# Patient Record
(demographics unavailable — no encounter records)

---

## 2017-12-19 NOTE — EMERGENCY DEPARTMENT REPORT
ED General Adult HPI





- General


Chief complaint: Nausea/Vomiting/Diarrhea


Stated complaint: NAUSEA/VOMITING


Time Seen by Provider: 12/19/17 06:27


Source: patient


Mode of arrival: Ambulatory


Limitations: No Limitations





- History of Present Illness


Initial comments: 


Patient complains of nausea and vomiting essentially hyperemesis gravidarum for 

the last few months.  She presents for persistent symptoms.  She does have OB 

care.  She's had a prior ultrasound.  She is more than 9 weeks pregnant.  She 

states that she had some discomfort in her epigastric region but it was 

apparently mild.  She's had no signs of GI bleeding.  She has a history of UTI 

at the end of September treated with Macrobid.





-: Gradual, month(s)


Location: abdomen


Radiation: non-radiation


Quality: aching


Consistency: intermittent


Improves with: none


Worsens with: none


Associated Symptoms: denies other symptoms


Treatments Prior to Arrival: none





- Related Data


 Home Medications











 Medication  Instructions  Recorded  Confirmed  Last Taken


 


Nitrofurantoin Mono/M-Cryst 1 tab PO Q12HR 09/28/16 09/28/16 Unknown





[Macrobid CAP]    


 


Ondansetron [Zofran ODT TAB] 1 tab PO Q8HR 09/28/16 09/28/16 Unknown








 Previous Rx's











 Medication  Instructions  Recorded  Last Taken  Type


 


Ferrous Sulfate [Feosol 325 MG tab] 325 mg PO BID #60 tablet 09/28/16 Unknown Rx


 


Ibuprofen [Motrin 800 MG tab] 800 mg PO Q8HR PRN #30 tablet 09/28/16 Unknown Rx


 


oxyCODONE /ACETAMINOPHEN [Percocet 1 tab PO Q6HR PRN #30 tablet 09/28/16 

Unknown Rx





5/325]    


 


Cefuroxime [Ceftin] 250 mg PO Q12H #10 tablet 12/19/17 Unknown Rx


 


Famotidine [Pepcid] 20 mg PO QDAY #20 tablet 12/19/17 Unknown Rx


 


Ondansetron [Zofran Odt] 4 mg PO Q6H PRN #7 tab.rapdis 12/19/17 Unknown Rx











 Allergies











Allergy/AdvReac Type Severity Reaction Status Date / Time


 


Penicillins AdvReac  Rash Verified 09/26/16 20:29














ED Review of Systems


ROS: 


Stated complaint: NAUSEA/VOMITING


Other details as noted in HPI





Constitutional: denies: chills, fever


Eyes: denies: eye pain, eye discharge, vision change


ENT: denies: ear pain, throat pain


Respiratory: denies: cough, shortness of breath, wheezing


Cardiovascular: denies: chest pain, palpitations


Endocrine: no symptoms reported


Gastrointestinal: abdominal pain, nausea, vomiting.  denies: diarrhea


Genitourinary: denies: urgency, dysuria, discharge


Musculoskeletal: denies: back pain, joint swelling, arthralgia


Skin: denies: rash, lesions


Neurological: denies: headache, weakness, paresthesias


Psychiatric: denies: anxiety, depression


Hematological/Lymphatic: denies: easy bleeding, easy bruising





ED Past Medical Hx





- Past Medical History


Previous Medical History?: Yes


Hx Hypertension: No


Hx Congestive Heart Failure: No


Hx Diabetes: No


Hx Deep Vein Thrombosis: No


Hx Renal Disease: No


Hx Sickle Cell Disease: No


Hx Seizures: No


Hx Asthma: No


Hx COPD: No


Additional medical history: Vaginal delivery x 2





- Surgical History


Past Surgical History?: No





- Social History


Smoking Status: Never Smoker


Substance Use Type: None





- Medications


Home Medications: 


 Home Medications











 Medication  Instructions  Recorded  Confirmed  Last Taken  Type


 


Ferrous Sulfate [Feosol 325 MG tab] 325 mg PO BID #60 tablet 09/28/16  Unknown 

Rx


 


Ibuprofen [Motrin 800 MG tab] 800 mg PO Q8HR PRN #30 tablet 09/28/16  Unknown Rx


 


Nitrofurantoin Mono/M-Cryst 1 tab PO Q12HR 09/28/16 09/28/16 Unknown History





[Macrobid CAP]     


 


Ondansetron [Zofran ODT TAB] 1 tab PO Q8HR 09/28/16 09/28/16 Unknown History


 


oxyCODONE /ACETAMINOPHEN [Percocet 1 tab PO Q6HR PRN #30 tablet 09/28/16  

Unknown Rx





5/325]     


 


Cefuroxime [Ceftin] 250 mg PO Q12H #10 tablet 12/19/17  Unknown Rx


 


Famotidine [Pepcid] 20 mg PO QDAY #20 tablet 12/19/17  Unknown Rx


 


Ondansetron [Zofran Odt] 4 mg PO Q6H PRN #7 tab.rapdis 12/19/17  Unknown Rx














ED Physical Exam





- General


Limitations: No Limitations


General appearance: alert, in no apparent distress, other (appears somewhat 

volume depleted)





- Head


Head exam: Present: atraumatic, normocephalic





- Eye


Eye exam: Present: normal appearance, PERRL, EOMI.  Absent: scleral icterus





- ENT


ENT exam: Present: mucous membranes moist





- Neck


Neck exam: Present: normal inspection.  Absent: tenderness, meningismus





- Respiratory


Respiratory exam: Present: normal lung sounds bilaterally.  Absent: respiratory 

distress





- Cardiovascular


Cardiovascular Exam: Present: regular rate, normal rhythm.  Absent: systolic 

murmur, diastolic murmur, rubs, gallop





- GI/Abdominal


GI/Abdominal exam: Present: soft, normal bowel sounds.  Absent: distended, 

tenderness, guarding, rebound, rigid





- Extremities Exam


Extremities exam: Present: normal inspection





- Back Exam


Back exam: Present: normal inspection





- Neurological Exam


Neurological exam: Present: alert, oriented X3





- Psychiatric


Psychiatric exam: Present: normal affect, normal mood





- Skin


Skin exam: Present: warm, dry, intact, normal color.  Absent: rash





ED Course


 Vital Signs











  12/18/17 12/19/17 12/19/17





  17:16 05:06 05:07


 


Temperature 99 F 98.1 F 


 


Pulse Rate 97 H 103 H 


 


Respiratory 23 16 16





Rate   


 


Blood Pressure 104/69  


 


Blood Pressure  111/75 





[Left]   


 


O2 Sat by Pulse 99 100 100





Oximetry   














- Reevaluation(s)


Reevaluation #1: 


Patient was given Zofran and IV fluids.  One dose of ceftriaxone.  One dose of 

potassium.  Urine culture sent.  She was discharged in improved condition.


12/19/17 09:17





12/19/17 09:17








ED Medical Decision Making





- Lab Data


Result diagrams: 


 12/18/17 17:28





 12/18/17 17:28








 Laboratory Results - last 24 hr











  12/18/17 12/18/17 12/18/17





  17:28 17:28 18:10


 


WBC  8.1  


 


RBC  5.04 H  


 


Hgb  15.2 H  


 


Hct  42.8  


 


MCV  85  


 


MCH  30  


 


MCHC  36 H  


 


RDW  13.0 L  


 


Plt Count  222  


 


Lymph % (Auto)  36.8 H  


 


Mono % (Auto)  11.5 H  


 


Eos % (Auto)  0.4  


 


Baso % (Auto)  0.3  


 


Lymph #  3.0  


 


Mono #  0.9 H  


 


Eos #  0.0  


 


Baso #  0.0  


 


Seg Neutrophils %  51.0  


 


Seg Neutrophils #  4.1  


 


Sodium   135 L 


 


Potassium   3.2 L 


 


Chloride   93.9 L 


 


Carbon Dioxide   21 L 


 


Anion Gap   23 


 


BUN   7 


 


Creatinine   0.4 L 


 


Estimated GFR   > 60 


 


BUN/Creatinine Ratio   18 


 


Glucose   98 


 


Calcium   9.5 


 


Lipase   


 


HCG, Quant   


 


Urine Color    Dark yellow


 


Urine Turbidity    Clear


 


Urine pH    6.0


 


Ur Specific Gravity    1.010


 


Urine Protein    30 mg/dl


 


Urine Glucose (UA)    Negative


 


Urine Ketones    100


 


Urine Blood    Negative


 


Urine Nitrite    Negative


 


Urine Bilirubin    Negative


 


Urine Urobilinogen    < 2.0


 


Ur Leukocyte Esterase    Moderate


 


Urine WBC (Auto)    10.0 H


 


Urine RBC (Auto)    4.0


 


U Epithel Cells (Auto)    25.0 H


 


Urine HCG, Qual    Positive A














  12/18/17 12/18/17





  20:12 20:12


 


WBC  


 


RBC  


 


Hgb  


 


Hct  


 


MCV  


 


MCH  


 


MCHC  


 


RDW  


 


Plt Count  


 


Lymph % (Auto)  


 


Mono % (Auto)  


 


Eos % (Auto)  


 


Baso % (Auto)  


 


Lymph #  


 


Mono #  


 


Eos #  


 


Baso #  


 


Seg Neutrophils %  


 


Seg Neutrophils #  


 


Sodium  


 


Potassium  


 


Chloride  


 


Carbon Dioxide  


 


Anion Gap  


 


BUN  


 


Creatinine  


 


Estimated GFR  


 


BUN/Creatinine Ratio  


 


Glucose  


 


Calcium  


 


Lipase  106 H 


 


HCG, Quant   474480 H


 


Urine Color  


 


Urine Turbidity  


 


Urine pH  


 


Ur Specific Gravity  


 


Urine Protein  


 


Urine Glucose (UA)  


 


Urine Ketones  


 


Urine Blood  


 


Urine Nitrite  


 


Urine Bilirubin  


 


Urine Urobilinogen  


 


Ur Leukocyte Esterase  


 


Urine WBC (Auto)  


 


Urine RBC (Auto)  


 


U Epithel Cells (Auto)  


 


Urine HCG, Qual  











Critical care attestation.: 


If time is entered above; I have spent that time in minutes in the direct care 

of this critically ill patient, excluding procedure time.








ED Disposition


Clinical Impression: 


 Hyperemesis gravidarum, Hypokalemia





UTI (urinary tract infection)


Qualifiers:


 Urinary tract infection type: site unspecified Hematuria presence: without 

hematuria Qualified Code(s): N39.0 - Urinary tract infection, site not specified





Disposition: DC-01 TO HOME OR SELFCARE


Is pt being admited?: No


Does the pt Need Aspirin: No


Condition: Stable


Instructions:  Hyperemesis Gravidarum (ED), Urinary Tract Infection in Women (ED

), Hypokalemia (ED)


Additional Instructions: 


Fluids and advance as tolerated.  Follow-up with your OB doctor.  Rx as 

directed.  Follow up on your urine culture.


Prescriptions: 


Cefuroxime [Ceftin] 250 mg PO Q12H #10 tablet


Famotidine [Pepcid] 20 mg PO QDAY #20 tablet


Ondansetron [Zofran Odt] 4 mg PO Q6H PRN #7 tab.rapdis


 PRN Reason: nausea


Referrals: 


CYNDIE CHENEY MD [Primary Care Provider] - 3-5 Days


usual, obstetrician [Other] - 3-5 Days


Time of Disposition: 09:18


Print Language: [place]ian

## 2018-01-04 NOTE — ULTRASOUND REPORT
FINAL REPORT



EXAM:  US ABDOMEN LIMITED



HISTORY:  abdominal pain 



TECHNIQUE:  Routine imaging was obtained of the right upper outer

quadrant.



FINDINGS:  

The gallbladder is mildly dilated with wall thickness of 1.9

millimeters. There are dependent stones and sludge in the

gallbladder. Miller sign was not elicited when scanning over the

gallbladder. The common bile duct is enlarged 7.7 millimeters.

The liver is normal in size and echotexture. The pancreatic head

appears normal. The body and tail of pancreas are not adequately

seen for evaluation. Free fluid is not identified.



The right kidney measures 9.4 cm x 3.7 cm x 5.4 cm. There are

several complex cysts in the middle 3rd upper pole of the right

kidney measuring 1.7 cm in diameter with internal echoes. There

is no evidence of hydronephrosis.



IMPRESSION:  

Gallstones with dependent sludge. No additional secondary signs

of acute cholecystitis.



Dilated common bile duct at 7.7 millimeters. A distal common bile

duct stone cannot be excluded.



Several small complex cysts in the middle 3rd upper pole the

right kidney with internal echoes. Repeat examination of the

kidneys recommended 6 months to confirm stability.

## 2018-01-04 NOTE — EMERGENCY DEPARTMENT REPORT
ED Abdominal Pain HPI





- General


Chief Complaint: Abdominal Pain


Stated Complaint: VOMITING


Time Seen by Provider: 01/03/18 21:14


Source: patient


Mode of arrival: Ambulatory


Limitations: No Limitations





- History of Present Illness


Initial Comments: 





Patient with nausea and vomiting for her whole pregnancy and has actually lost 

22 lbs thus far.  She saw her OB today and was sent here as she has ketones in 

her urine and was tachycardic.  She does report having abdominal pain when she 

eats as well. 


MD Complaint: abdominal pain


-: Gradual, week(s) (12)


Location: diffuse, RUQ


Radiation: RUQ


Migration to: no migration


Severity: moderate


Severity scale (0 -10): 8


Quality: cramping, sharp


Consistency: constant, intermittent


Improves With: nothing


Worsens With: eating


Associated Symptoms: nausea, vomiting





- Related Data


 Home Medications











 Medication  Instructions  Recorded  Confirmed  Last Taken


 


Nitrofurantoin Mono/M-Cryst 1 tab PO Q12HR 09/28/16 09/28/16 Unknown





[Macrobid CAP]    


 


Ondansetron [Zofran ODT TAB] 1 tab PO Q8HR 09/28/16 09/28/16 Unknown








 Previous Rx's











 Medication  Instructions  Recorded  Last Taken  Type


 


Ferrous Sulfate [Feosol 325 MG tab] 325 mg PO BID #60 tablet 09/28/16 Unknown Rx


 


Ibuprofen [Motrin 800 MG tab] 800 mg PO Q8HR PRN #30 tablet 09/28/16 Unknown Rx


 


oxyCODONE /ACETAMINOPHEN [Percocet 1 tab PO Q6HR PRN #30 tablet 09/28/16 

Unknown Rx





5/325]    


 


Cefuroxime [Ceftin] 250 mg PO Q12H #10 tablet 12/19/17 Unknown Rx


 


Famotidine [Pepcid] 20 mg PO QDAY #20 tablet 12/19/17 Unknown Rx


 


Ondansetron [Zofran Odt] 4 mg PO Q6H PRN #7 tab.rapdis 12/19/17 Unknown Rx











 Allergies











Allergy/AdvReac Type Severity Reaction Status Date / Time


 


Penicillins AdvReac  Rash Verified 09/26/16 20:29














ED Review of Systems


ROS: 


Stated complaint: VOMITING


Other details as noted in HPI





Constitutional: denies: chills, fever


Eyes: denies: eye pain, eye discharge, vision change


ENT: denies: ear pain, throat pain


Respiratory: denies: cough, shortness of breath, wheezing


Cardiovascular: denies: chest pain, palpitations


Endocrine: no symptoms reported


Gastrointestinal: abdominal pain, nausea, vomiting.  denies: diarrhea


Genitourinary: denies: urgency, dysuria, discharge


Musculoskeletal: denies: back pain, joint swelling, arthralgia


Skin: denies: rash, lesions


Neurological: denies: headache, weakness, paresthesias


Psychiatric: denies: anxiety, depression


Hematological/Lymphatic: denies: easy bleeding, easy bruising





ED Past Medical Hx





- Past Medical History


Hx Hypertension: No


Hx Congestive Heart Failure: No


Hx Diabetes: No


Hx Deep Vein Thrombosis: No


Hx Renal Disease: No


Hx Sickle Cell Disease: No


Hx Seizures: No


Hx Asthma: No


Hx COPD: No


Additional medical history: Vaginal delivery x 2





- Social History


Smoking Status: Never Smoker


Substance Use Type: None





- Medications


Home Medications: 


 Home Medications











 Medication  Instructions  Recorded  Confirmed  Last Taken  Type


 


Ferrous Sulfate [Feosol 325 MG tab] 325 mg PO BID #60 tablet 09/28/16  Unknown 

Rx


 


Ibuprofen [Motrin 800 MG tab] 800 mg PO Q8HR PRN #30 tablet 09/28/16  Unknown Rx


 


Nitrofurantoin Mono/M-Cryst 1 tab PO Q12HR 09/28/16 09/28/16 Unknown History





[Macrobid CAP]     


 


Ondansetron [Zofran ODT TAB] 1 tab PO Q8HR 09/28/16 09/28/16 Unknown History


 


oxyCODONE /ACETAMINOPHEN [Percocet 1 tab PO Q6HR PRN #30 tablet 09/28/16  

Unknown Rx





5/325]     


 


Cefuroxime [Ceftin] 250 mg PO Q12H #10 tablet 12/19/17  Unknown Rx


 


Famotidine [Pepcid] 20 mg PO QDAY #20 tablet 12/19/17  Unknown Rx


 


Ondansetron [Zofran Odt] 4 mg PO Q6H PRN #7 tab.rapdis 12/19/17  Unknown Rx














ED Physical Exam





- General


Limitations: No Limitations


General appearance: alert, in no apparent distress





- Head


Head exam: Present: atraumatic, normocephalic





- Eye


Eye exam: Present: normal appearance





- ENT


ENT exam: Present: mucous membranes moist





- Neck


Neck exam: Present: normal inspection





- Respiratory


Respiratory exam: Present: normal lung sounds bilaterally.  Absent: respiratory 

distress





- Cardiovascular


Cardiovascular Exam: Present: normal rhythm, tachycardia.  Absent: systolic 

murmur, diastolic murmur, rubs, gallop





- GI/Abdominal


GI/Abdominal exam: Present: soft, distended, tenderness (RUQ but no peritonitis

, She does have positive Miller's sign.), normal bowel sounds





- Extremities Exam


Extremities exam: Present: normal inspection





- Back Exam


Back exam: Present: normal inspection





- Neurological Exam


Neurological exam: Present: alert, oriented X3





- Psychiatric


Psychiatric exam: Present: normal affect, normal mood





- Skin


Skin exam: Present: warm, dry, intact, normal color.  Absent: rash





ED Course


 Vital Signs











  01/03/18 01/03/18 01/03/18





  14:42 20:26 21:20


 


Temperature 97.4 F L 97.4 F L 


 


Pulse Rate 128 H 136 H 


 


Respiratory 18 18 





Rate   


 


Blood Pressure 108/77 113/63 


 


O2 Sat by Pulse 99 97 98





Oximetry   














  01/03/18 01/03/18





  21:30 22:27


 


Temperature  


 


Pulse Rate  


 


Respiratory  16





Rate  


 


Blood Pressure 111/66 


 


O2 Sat by Pulse 98 98





Oximetry  














ED Medical Decision Making





- Lab Data


Result diagrams: 


 01/03/18 15:45





 01/03/18 15:45


Patient with low potassium and appears dehydrated. Liver enzymes are elevated 

as well. 





- Radiology Data


Radiology results: report reviewed





Patient with IUP at 12 weeks and 6 days. US GB revealed dilation of common duct 

with gallstones and sludge but no signs of acute cholecystitis.





- Medical Decision Making





Patient with abdominal pain and pregnant with what appears to be cholelithiasis 

and common bile duct involvement.  D/W Dr. Aggarwal and they will admit for 

monitoring since she can't tolerate PO, is dehdyrated and has cholelithiasis 

with LFTs elevated.


Critical care attestation.: 


If time is entered above; I have spent that time in minutes in the direct care 

of this critically ill patient, excluding procedure time.








ED Disposition


Clinical Impression: 


 Hyperemesis gravidarum, Cholelithiasis affecting pregnancy in first trimester, 

antepartum, Elevated LFTs, Dehydration during pregnancy





Disposition: DC-09 OP ADMIT IP TO THIS HOSP


Is pt being admited?: Yes


Does the pt Need Aspirin: No


Condition: Stable


Instructions:  Abdominal Pain (ED)


Referrals: 


TEREAS ZEPEDA MD [Referring] - 3-5 Days


Time of Disposition: 01:41

## 2018-01-04 NOTE — CONSULTATION
History of Present Illness


Consult date: 01/04/18


Reason for consult: abdominal pain


Requesting physician: BRANDEN CERON


Chief complaint: 





Abomdinal pain, Nausea, and vomiting for 5 days





- History of present illness


History of present illness: 





Patient is a 40-year-old female who is 12 weeks pregnant and presented to the 

emergency room last night with a five-day history of abdominal pain, nausea, 

vomiting.  Patient reports she has had nausea and vomiting during her pregnancy

, but this time it was more bitter and dark yellow in color as opposed to 

clear.  She reports that all of a sudden her vomit changed as described above 

and she began having right quadrant pain.  This is pretty much present 

constantly.  She would have very brief periods of decrease in intensity but the 

pain was always there.  The pain does go to the back and both shoulders.  It 

hurts to touch in the right shoulder and the right side of the back.  She 

reports that she has not had a bowel movement up until today.  Today's bowel 

movement was diarrhea with lots of water.  The appearance of the bowel movement 

was grayish.  Her urine has been very dark.  Denies any yellow eyes or skin.  

Denies any postprandial pain in the past.  Has never been told she has problems 

with gallstones.  Denies any fevers or severe chills. 





Past History


Past Medical History: No medical history


Past Surgical History: No surgical history


Social history: , other (does not drink or smoke)


Family history: no significant family history





Medications and Allergies


 Allergies











Allergy/AdvReac Type Severity Reaction Status Date / Time


 


Penicillins AdvReac  Rash Verified 01/04/18 13:31











 Home Medications











 Medication  Instructions  Recorded  Confirmed  Last Taken  Type


 


Ferrous Sulfate [Feosol 325 MG tab] 325 mg PO BID #60 tablet 09/28/16  Unknown 

Rx


 


Ibuprofen [Motrin 800 MG tab] 800 mg PO Q8HR PRN #30 tablet 09/28/16  Unknown Rx


 


Nitrofurantoin Mono/M-Cryst 1 tab PO Q12HR 09/28/16 09/28/16 Unknown History





[Macrobid CAP]     


 


Ondansetron [Zofran ODT TAB] 1 tab PO Q8HR 09/28/16 09/28/16 Unknown History


 


oxyCODONE /ACETAMINOPHEN [Percocet 1 tab PO Q6HR PRN #30 tablet 09/28/16  

Unknown Rx





5/325]     


 


Cefuroxime [Ceftin] 250 mg PO Q12H #10 tablet 12/19/17  Unknown Rx


 


Famotidine [Pepcid] 20 mg PO QDAY #20 tablet 12/19/17  Unknown Rx


 


Ondansetron [Zofran Odt] 4 mg PO Q6H PRN #7 tab.rapdis 12/19/17  Unknown Rx











Active Meds: 


Active Medications





Sodium Chloride (Nacl 0.9% 1000 Ml)  1,000 mls @ 125 mls/hr IV AS DIRECT ISABELA


   Last Admin: 01/04/18 10:46 Dose:  125 mls/hr


Potassium Chloride (Kcl 10meq/100ml)  10 meq in 100 mls @ 100 mls/hr IV Q1H ISABELA


   Stop: 01/04/18 17:59


Morphine Sulfate (Morphine)  4 mg IV Q8H PRN


   PRN Reason: Pain , Severe (7-10)


   Last Admin: 01/04/18 13:09 Dose:  4 mg


Multivitamins/Iron/Calcium (Prenatal Vitamin)  1 each PO QDAY ISABELA


   Last Admin: 01/04/18 11:19 Dose:  1 each


Ondansetron HCl (Zofran)  4 mg IV Q8HR PRN


   PRN Reason: Nausea


   Last Admin: 01/04/18 08:18 Dose:  4 mg











Review of Systems





- Constitutional


fatigue, no fever, no chills, no sweats





- EENT


Ears, nose, mouth and throat: other (dry mouth)





- Breasts


deferred





- Cardiovascular


no chest pain, no shortness of breath





- Respiratory


no cough, no shortness of breath, no congestion, no pain, no pain on inspiration





- Gastrointestinal


abdominal pain (right upper quadrant), nausea, vomiting, diarrhea (began today)

, heartburn, dyspepsia/bloating, no BRBPR, no melena, no hematochezia





- Genitourinary


Genitourinary: no dysuria





- Muskuloskeletal


other (his bilateral shoulder pain and right flank pain)


bilateral: shoulder pain





- Integumentary


no pruritis, no jaundice





Exam


 Vital Signs











Temp Pulse Resp BP Pulse Ox


 


 97.4 F L  128 H  18   108/77   99 


 


 01/03/18 14:42  01/03/18 14:42  01/03/18 14:42  01/03/18 14:42  01/03/18 14:42














- General physical appearance


Positive: well developed, well nourished, no distress





- Eyes


Positive: normal occular movement.  Negative: icteric





- ENT


Positive: no congestion, other (dry oral mucosa)





- Neck


Positive: no masses, no lymphadectomy





- Respiratory


Positive: normal expansion, normal respiratory effort, clear to auscultation





- Cardiovascular


Rhythm: other (slightly fast)





- Extremities


Extremities: no ischemia, No edema, normal temperature, normal color


Extremity abnormal: tenderness (in right shoulder)





- Abdomen


Abdomen: Present: soft, tender (mild and only in the right upper quadrant.), 

bowel sounds normal.  Absent: distended, masses, rebound, guarding, rigid





- Psychiatric


Psychiatric: appropriate mood/affect, intact judgment & insight, cooperative





- Additional Findings





Tender palpation in the right flank and back





Results





- Labs





 01/04/18 10:46





 01/04/18 10:46


 Abnormal lab results











  01/03/18 01/03/18 01/03/18 Range/Units





  15:45 15:45 16:05 


 


Hgb  15.0 H    (10.1-14.3)  gm/dl


 


MCHC  36 H    (30-34)  %


 


Mono % (Auto)  11.1 H    (0.0-7.3)  %


 


Mono #  0.9 H    (0.0-0.8)  K/mm3


 


Seg Neutrophils %     (40.0-70.0)  %


 


Seg Neutrophils #     (1.8-7.7)  K/mm3


 


Sodium   134 L   (137-145)  mmol/L


 


Potassium   2.6 L*   (3.6-5.0)  mmol/L


 


Chloride   91.0 L   ()  mmol/L


 


Carbon Dioxide     (22-30)  mmol/L


 


BUN     (7-17)  mg/dL


 


Creatinine   0.4 L   (0.7-1.2)  mg/dL


 


Glucose   114 H   ()  mg/dL


 


Calcium     (8.4-10.2)  mg/dL


 


Total Bilirubin   1.50 H   (0.1-1.2)  mg/dL


 


AST   110 H   (5-40)  units/L


 


ALT   118 H   (7-56)  units/L


 


Total Protein     (6.3-8.2)  g/dL


 


Albumin     (3.9-5)  g/dL


 


Amylase     ()  units/L


 


Lipase     (13-60)  units/L


 


Urine WBC (Auto)    15.0 H  (0.0-6.0)  /HPF


 


Urine HCG, Qual     (Negative)  














  01/03/18 01/04/18 01/04/18 Range/Units





  23:59 01:54 01:54 


 


Hgb     (10.1-14.3)  gm/dl


 


MCHC     (30-34)  %


 


Mono % (Auto)     (0.0-7.3)  %


 


Mono #     (0.0-0.8)  K/mm3


 


Seg Neutrophils %     (40.0-70.0)  %


 


Seg Neutrophils #     (1.8-7.7)  K/mm3


 


Sodium    135 L  (137-145)  mmol/L


 


Potassium    2.4 L*  (3.6-5.0)  mmol/L


 


Chloride     ()  mmol/L


 


Carbon Dioxide    19 L  (22-30)  mmol/L


 


BUN    6 L  (7-17)  mg/dL


 


Creatinine    0.3 L  (0.7-1.2)  mg/dL


 


Glucose     ()  mg/dL


 


Calcium    7.9 L D  (8.4-10.2)  mg/dL


 


Total Bilirubin     (0.1-1.2)  mg/dL


 


AST     (5-40)  units/L


 


ALT     (7-56)  units/L


 


Total Protein     (6.3-8.2)  g/dL


 


Albumin     (3.9-5)  g/dL


 


Amylase     ()  units/L


 


Lipase   161 H   (13-60)  units/L


 


Urine WBC (Auto)     (0.0-6.0)  /HPF


 


Urine HCG, Qual  Positive A    (Negative)  














  01/04/18 01/04/18 01/04/18 Range/Units





  09:31 10:46 10:46 


 


Hgb     (10.1-14.3)  gm/dl


 


MCHC   35 H   (30-34)  %


 


Mono % (Auto)     (0.0-7.3)  %


 


Mono #     (0.0-0.8)  K/mm3


 


Seg Neutrophils %   74.1 H   (40.0-70.0)  %


 


Seg Neutrophils #   7.9 H   (1.8-7.7)  K/mm3


 


Sodium    135 L  (137-145)  mmol/L


 


Potassium    2.4 L*  (3.6-5.0)  mmol/L


 


Chloride     ()  mmol/L


 


Carbon Dioxide    17 L  (22-30)  mmol/L


 


BUN    5 L  (7-17)  mg/dL


 


Creatinine    0.3 L  (0.7-1.2)  mg/dL


 


Glucose     ()  mg/dL


 


Calcium    8.0 L  (8.4-10.2)  mg/dL


 


Total Bilirubin     (0.1-1.2)  mg/dL


 


AST    75 H  (5-40)  units/L


 


ALT    92 H  (7-56)  units/L


 


Total Protein    5.8 L D  (6.3-8.2)  g/dL


 


Albumin    3.2 L  (3.9-5)  g/dL


 


Amylase  135 H    ()  units/L


 


Lipase  168 H    (13-60)  units/L


 


Urine WBC (Auto)     (0.0-6.0)  /HPF


 


Urine HCG, Qual     (Negative)  








 Diabetes panel











  01/03/18 01/04/18 01/04/18 Range/Units





  15:45 01:54 10:46 


 


Sodium  134 L  135 L  135 L  (137-145)  mmol/L


 


Potassium  2.6 L*  2.4 L*  2.4 L*  (3.6-5.0)  mmol/L


 


Chloride  91.0 L  99.5  98.9  ()  mmol/L


 


Carbon Dioxide  23  19 L  17 L  (22-30)  mmol/L


 


BUN  8  6 L  5 L  (7-17)  mg/dL


 


Creatinine  0.4 L  0.3 L  0.3 L  (0.7-1.2)  mg/dL


 


Glucose  114 H  94  81  ()  mg/dL


 


Calcium  9.6  7.9 L D  8.0 L  (8.4-10.2)  mg/dL


 


AST  110 H   75 H  (5-40)  units/L


 


ALT  118 H   92 H  (7-56)  units/L


 


Alkaline Phosphatase  116   91  ()  units/L


 


Total Protein  7.3   5.8 L D  (6.3-8.2)  g/dL


 


Albumin  4.3   3.2 L  (3.9-5)  g/dL








 Calcium panel











  01/03/18 01/04/18 01/04/18 Range/Units





  15:45 01:54 10:46 


 


Calcium  9.6  7.9 L D  8.0 L  (8.4-10.2)  mg/dL


 


Albumin  4.3   3.2 L  (3.9-5)  g/dL








 Pituitary panel











  01/03/18 01/04/18 01/04/18 Range/Units





  15:45 01:54 10:46 


 


Sodium  134 L  135 L  135 L  (137-145)  mmol/L


 


Potassium  2.6 L*  2.4 L*  2.4 L*  (3.6-5.0)  mmol/L


 


Chloride  91.0 L  99.5  98.9  ()  mmol/L


 


Carbon Dioxide  23  19 L  17 L  (22-30)  mmol/L


 


BUN  8  6 L  5 L  (7-17)  mg/dL


 


Creatinine  0.4 L  0.3 L  0.3 L  (0.7-1.2)  mg/dL


 


Glucose  114 H  94  81  ()  mg/dL


 


Calcium  9.6  7.9 L D  8.0 L  (8.4-10.2)  mg/dL








 Adrenal panel











  01/03/18 01/04/18 01/04/18 Range/Units





  15:45 01:54 10:46 


 


Sodium  134 L  135 L  135 L  (137-145)  mmol/L


 


Potassium  2.6 L*  2.4 L*  2.4 L*  (3.6-5.0)  mmol/L


 


Chloride  91.0 L  99.5  98.9  ()  mmol/L


 


Carbon Dioxide  23  19 L  17 L  (22-30)  mmol/L


 


BUN  8  6 L  5 L  (7-17)  mg/dL


 


Creatinine  0.4 L  0.3 L  0.3 L  (0.7-1.2)  mg/dL


 


Glucose  114 H  94  81  ()  mg/dL


 


Calcium  9.6  7.9 L D  8.0 L  (8.4-10.2)  mg/dL


 


Total Bilirubin  1.50 H   1.20  (0.1-1.2)  mg/dL


 


AST  110 H   75 H  (5-40)  units/L


 


ALT  118 H   92 H  (7-56)  units/L


 


Alkaline Phosphatase  116   91  ()  units/L


 


Total Protein  7.3   5.8 L D  (6.3-8.2)  g/dL


 


Albumin  4.3   3.2 L  (3.9-5)  g/dL














- Imaging


US - abdomen: report reviewed (No evidence to suggest inflammatory change or 

obstruction)





Assessment and Plan





Abdominal Pain with Nausea and vomiting.  This may be multifactorial.  It is 

possible she may have some very mild inflammatory changes affecting the 

gallbladder as a result of the stone that just recently passed.  The nausea and 

vomiting may be an aggravation of her baseline symptoms.  It is also possible 

she may have hepatitis, however, I think with the low LFT numbers and the quick 

resolution this seems less likely.  Evaluation for hepatitis is reasonable 

though.





- Patient Problems


(1) Cholelithiasis affecting pregnancy in first trimester, antepartum


Current Visit: Yes   Status: Acute   


Plan to address problem: 


At this point, there is no urgent indication for surgery.  Prefer to delay any 

surgery until patient is in the 2nd trimester.  I've discussed this in detail 

with the attending, Dr. Ceron.  She is an agreement.  Based on her laboratory 

results, I think it is quite likely that she may have passed stone.  Her 

numbers are improving.  Continue with conservative care for now with 

rehydration and pain control.  If she has recurrent attacks, then consider 

laparoscopic cholecystectomy during the 2nd trimester.  This explained to the 

patient via the .








(2) Dehydration during pregnancy


Current Visit: Yes   Status: Acute   


Plan to address problem: 


Agree with the plan per the primary team for IV hydration.  We encourage PO 

intake when nausea has resolved.








(3) Elevated LFTs


Current Visit: Yes   Status: Acute   


Plan to address problem: 


Appears to be resolving.  Continue to follow for now.  No need for any 

intervention at this time.  Repeat labs in the a.m.








(4) Hypokalemia


Current Visit: Yes   Status: Acute   


Plan to address problem: 


Agree with replacement for the primary team plan

## 2018-01-04 NOTE — ULTRASOUND REPORT
FINAL REPORT



EXAM:  US OB &lt; = 14 WEEKS FETUS



HISTORY:  abdominal pain 



TECHNIQUE:  Transabdominal imaging was obtained of the pelvis.

Doppler interrogation of the uterus and adnexa was also obtained.



FINDINGS:  

The uterus measures 9.7 cm x 7.5 cm x 9.5 cm. Within the uterus

is a gestational sac containing a fetal pole. The fetal pole

corresponds to a 12 week 6 day IUP. The fetal heart is 167 bpm.

The placenta is posterior in position. There is no evidence of

subchorionic hemorrhage. Free fluid is not seen. The cervix is

closed. The maternal right ovary is normal size contour blood

flow and echotexture measuring 2.4 cm x 2.2 cm x 2.9 cm. The left

ovary measures 3.9 cm x 2.3 cm x 2.1 cm. Within the left ovary is

a 2.7 cm cystic structure most likely representing corpus luteum

cyst. 



IMPRESSION:  

Single viable intrauterine pregnancy, 12 weeks 6 days.



2.7 cm corpus luteum cyst left ovary.



No evidence of free fluid.

## 2018-01-04 NOTE — HISTORY AND PHYSICAL REPORT
History of Present Illness


Date of examination: 18


Date of admission: 


18 01:49





Chief complaint: 





12 weeks pregnant with vomiting, gallstone, elevated liver enzymes.


History of present illness: 





Patient is a 40 year old , LMP 10/11/17 who is at 12 weeks gestation and 

complains of having vominting, inability to tolerated PO intake, 12-lbs weight 

loss, RUQ pain for 5 days. She denies any fever, pelvic pain, or vaginal 

bleeding. She just started PNC at [facility]. She went  for her 

routine visit yesterday with the above complaints. She was sent to the ER for 

evaluation. In the ER, she was found to be very dehydrated, vomiting, elevated 

liver enzymes and low potassium. She was given IV fluid, K+ via K-rider, 

zofran. Abdominal sonogram showed: + gallstone with gallbladder sludge. OB sono 

showed a viable fetus at 12 weeks.


The patient still complains of vomiting. Exam showed + RUQ TN.





Past History





- Obstetrical History


: 2





Medications and Allergies


 Allergies











Allergy/AdvReac Type Severity Reaction Status Date / Time


 


Penicillins AdvReac  Rash Verified 16 20:29











 Home Medications











 Medication  Instructions  Recorded  Confirmed  Last Taken  Type


 


Ferrous Sulfate [Feosol 325 MG tab] 325 mg PO BID #60 tablet 16  Unknown 

Rx


 


Ibuprofen [Motrin 800 MG tab] 800 mg PO Q8HR PRN #30 tablet 16  Unknown Rx


 


Nitrofurantoin Mono/M-Cryst 1 tab PO Q12HR 16 Unknown History





[Macrobid CAP]     


 


Ondansetron [Zofran ODT TAB] 1 tab PO Q8HR 16 Unknown History


 


oxyCODONE /ACETAMINOPHEN [Percocet 1 tab PO Q6HR PRN #30 tablet 16  

Unknown Rx





5/325]     


 


Cefuroxime [Ceftin] 250 mg PO Q12H #10 tablet 17  Unknown Rx


 


Famotidine [Pepcid] 20 mg PO QDAY #20 tablet 17  Unknown Rx


 


Ondansetron [Zofran Odt] 4 mg PO Q6H PRN #7 tab.rapdis 17  Unknown Rx











Active Meds: 


Active Medications





Sodium Chloride (Nacl 0.9% 1000 Ml)  1,000 mls @ 125 mls/hr IV AS DIRECT ISABELA


   Last Admin: 18 10:46 Dose:  125 mls/hr


Influenza Virus Vaccine Quadrival (Fluarix Quad [id number](36 Mos+)  0.5 ml IM 

.ONCE ONE


   Stop: 18 12:01


Morphine Sulfate (Morphine)  4 mg IV Q8H PRN


   PRN Reason: Pain , Severe (7-10)


   Last Admin: 18 05:32 Dose:  4 mg


Multivitamins/Iron/Calcium (Prenatal Vitamin)  1 each PO QDAY [facility]


Ondansetron HCl (Zofran)  4 mg IV Q8HR PRN


   PRN Reason: Nausea


   Last Admin: 18 08:18 Dose:  4 mg











- Vital Signs


Vital signs: 


 Vital Signs











Temp Pulse Resp BP Pulse Ox


 


 97.4 F L  128 H  18   108/77   99 


 


 18 14:42  18 14:42  18 14:42  18 14:42  18 14:42








 











Temp Pulse Resp BP Pulse Ox


 


 98.7 F   110 H  18   110/58   97 


 


 18 08:56  18 08:56  18 08:56  18 08:56  18 05:10














Results


Result Diagrams: 


 18 10:46





 18 10:46


 Abnormal lab results











  18 Range/Units





  15:45 15:45 16:05 


 


Hgb  15.0 H    (10.1-14.3)  gm/dl


 


MCHC  36 H    (30-34)  %


 


Mono % (Auto)  11.1 H    (0.0-7.3)  %


 


Mono #  0.9 H    (0.0-0.8)  K/mm3


 


Sodium   134 L   (137-145)  mmol/L


 


Potassium   2.6 L*   (3.6-5.0)  mmol/L


 


Chloride   91.0 L   ()  mmol/L


 


Carbon Dioxide     (22-30)  mmol/L


 


BUN     (7-17)  mg/dL


 


Creatinine   0.4 L   (0.7-1.2)  mg/dL


 


Glucose   114 H   ()  mg/dL


 


Calcium     (8.4-10.2)  mg/dL


 


Total Bilirubin   1.50 H   (0.1-1.2)  mg/dL


 


AST   110 H   (5-40)  units/L


 


ALT   118 H   (7-56)  units/L


 


Amylase     ()  units/L


 


Lipase     (13-60)  units/L


 


Urine WBC (Auto)    15.0 H  (0.0-6.0)  /HPF


 


Urine HCG, Qual     (Negative)  














  18 Range/Units





  23:59 01:54 01:54 


 


Hgb     (10.1-14.3)  gm/dl


 


MCHC     (30-34)  %


 


Mono % (Auto)     (0.0-7.3)  %


 


Mono #     (0.0-0.8)  K/mm3


 


Sodium    135 L  (137-145)  mmol/L


 


Potassium    2.4 L*  (3.6-5.0)  mmol/L


 


Chloride     ()  mmol/L


 


Carbon Dioxide    19 L  (22-30)  mmol/L


 


BUN    6 L  (7-17)  mg/dL


 


Creatinine    0.3 L  (0.7-1.2)  mg/dL


 


Glucose     ()  mg/dL


 


Calcium    7.9 L D  (8.4-10.2)  mg/dL


 


Total Bilirubin     (0.1-1.2)  mg/dL


 


AST     (5-40)  units/L


 


ALT     (7-56)  units/L


 


Amylase     ()  units/L


 


Lipase   161 H   (13-60)  units/L


 


Urine WBC (Auto)     (0.0-6.0)  /HPF


 


Urine HCG, Qual  Positive A    (Negative)  














  18 Range/Units





  09:31 


 


Hgb   (10.1-14.3)  gm/dl


 


MCHC   (30-34)  %


 


Mono % (Auto)   (0.0-7.3)  %


 


Mono #   (0.0-0.8)  K/mm3


 


Sodium   (137-145)  mmol/L


 


Potassium   (3.6-5.0)  mmol/L


 


Chloride   ()  mmol/L


 


Carbon Dioxide   (22-30)  mmol/L


 


BUN   (7-17)  mg/dL


 


Creatinine   (0.7-1.2)  mg/dL


 


Glucose   ()  mg/dL


 


Calcium   (8.4-10.2)  mg/dL


 


Total Bilirubin   (0.1-1.2)  mg/dL


 


AST   (5-40)  units/L


 


ALT   (7-56)  units/L


 


Amylase  135 H  ()  units/L


 


Lipase  168 H  (13-60)  units/L


 


Urine WBC (Auto)   (0.0-6.0)  /HPF


 


Urine HCG, Qual   (Negative)  








All other labs normal.








Assessment and Plan





- Patient Problems


(1) 12 weeks gestation of pregnancy


Current Visit: Yes   Status: Acute   


Plan to address problem: 


Ob sono done. Normal FH








(2) Cholelithiasis affecting pregnancy in first trimester, antepartum


Current Visit: Yes   Status: Acute   


Plan to address problem: 


Surgical consult ordered.








(3) Hyperemesis gravidarum


Current Visit: Yes   Status: Acute   


Plan to address problem: 


Continue zofran PRN.


IV hydration. Monitor electrolytes.


Serum amylase and lipase ordered. TSH and free T4 ordered.








(4) Elevated LFTs


Current Visit: Yes   Status: Acute   


Plan to address problem: 


Hepatitis A, B, C panels ordered.








(5) Hypokalemia


Current Visit: Yes   Status: Acute   


Plan to address problem: 


K-rider was given. repeat K+ ordered.








(6) Urinary symptom or sign


Current Visit: Yes   Status: Acute   


Plan to address problem: 


Urine culture ordered.

## 2018-01-05 NOTE — PROGRESS NOTE
Assessment and Plan





- Patient Problems


(1) Cholelithiasis affecting pregnancy in first trimester, antepartum


Current Visit: Yes   Status: Acute   


Plan to address problem: 


Surgical consult done. Dr. Vallejo recommended no intervention at this time. 

Patient will follow with him as outpatient.








(2) Hyperemesis gravidarum


Current Visit: Yes   Status: Acute   


Plan to address problem: 


Continue zofran PRN.


IV hydration. Monitor electrolytes.


Advance diet today.


If no vomiting today, pt may be d/c home tomorrow.








(3) Elevated LFTs


Current Visit: Yes   Status: Acute   


Plan to address problem: 


Hepatitis A, B negative. Hep C panels pending.








(4) Hypokalemia


Current Visit: Yes   Status: Acute   


Plan to address problem: 


K-rider was given. Potassium still low. K-rider potassium was given today. Will 

F/U level.








(5) Urinary symptom or sign


Current Visit: Yes   Status: Acute   


Plan to address problem: 


Urine culture pending.








(6) 13 weeks gestation of pregnancy


Current Visit: Yes   Status: Acute   


Plan to address problem: 


Prenatal labs were done at the clinic.








(7) Hyperthyroidism affecting pregnancy


Current Visit: Yes   Status: Acute   


Plan to address problem: 


 mg PO TID ordered.


MFM consult done.








Subjective





- Subjective


Date of service: 18


Principal diagnosis: vomiting, hyperthyroidism


Interval history: 





Patient is a 40 year old , LMP 10/11/17 who is at 13 weeks gestation who 

was admitted early yesterday for vomiting, inability to tolerated PO intake, 12-

lbs weight loss, RUQ pain which started 5 days earlier. She denied any fever, 

pelvic pain, or vaginal bleeding. She just started PNC at [facility]. She went  for her routine visit on 17 with the above complaints. 

She was sent to the ER for evaluation. In the ER, she was found to be very 

dehydrated, vomiting, elevated liver enzymes and low potassium. She was given 

IV fluid, K+ via K-rider, zofran. Abdominal sonogram showed: + gallstone with 

gallbladder sludge. Surgical consult was done and recommended no intervention 

at present as patient is afebrile


OB sono showed a viable fetus at 12 weeks and 6 days.


On exam: + TN in her RUQ and right upper back, no pelvic TN.


Today, her pain has decerased in severity and she feels hungry. Her LFT's has 

slightly decreased.


She has been tachycardic and was found to have hyperthyroidism. 


PTU has been given.


MFM consult done and agreed with current management.


This afternoon, the patient complained of feeling numbness while she was being 

given potassium infusion. That was the 4th bag with 10 mEq KCL. The infusion 

was stopped.  was present and translated. Patient denies any SOB or 

chest pain. Exam: good motor function in all extremities. Patient was 

reassured. Since she is no longer nauseous, she is advised to eat bananas and 

will give potassium PO if her level is still low. Will F/U K+ level tonight.





Objective





- Vital Signs


Vital Signs: 


 Vital Signs - 12hr











  18





  04:30 08:30 12:41


 


Temperature 97.8 F 97.6 F 97.3 F L


 


Pulse Rate 99 H 95 H 99 H


 


Respiratory 18 18 





Rate   


 


Blood Pressure 100/50 102/50 177/62





[Right]   


 


O2 Sat by Pulse 99  





Oximetry   














  18





  15:01


 


Temperature 97.6 F


 


Pulse Rate 115 H


 


Respiratory 20





Rate 


 


Blood Pressure 119/66





[Right] 


 


O2 Sat by Pulse 99





Oximetry 














- Exam


Narrative Exam: 





Exam: good motor function in all extremities.


Cardiovascular: Normal S1, Normal S2


Lungs: Clear to auscultation


Uterine Contraction Pattern: Absent





- Labs


Labs: 


 Abnormal Labs











  18





  15:45 15:45 16:05


 


Hgb  15.0 H  


 


MCHC  36 H  


 


Mono % (Auto)  11.1 H  


 


Mono #  0.9 H  


 


Seg Neutrophils %   


 


Seg Neutrophils #   


 


Sodium   134 L 


 


Potassium   2.6 L* 


 


Chloride   91.0 L 


 


Carbon Dioxide   


 


BUN   


 


Creatinine   0.4 L 


 


Glucose   114 H 


 


Calcium   


 


Total Bilirubin   1.50 H 


 


Direct Bilirubin   


 


AST   110 H 


 


ALT   118 H 


 


Total Protein   


 


Albumin   


 


Amylase   


 


Lipase   


 


TSH   


 


Free T4   


 


Urine WBC (Auto)    15.0 H


 


Urine HCG, Qual   














  18





  23:59 01:54 01:54


 


Hgb   


 


MCHC   


 


Mono % (Auto)   


 


Mono #   


 


Seg Neutrophils %   


 


Seg Neutrophils #   


 


Sodium    135 L


 


Potassium    2.4 L*


 


Chloride   


 


Carbon Dioxide    19 L


 


BUN    6 L


 


Creatinine    0.3 L


 


Glucose   


 


Calcium    7.9 L D


 


Total Bilirubin   


 


Direct Bilirubin   


 


AST   


 


ALT   


 


Total Protein   


 


Albumin   


 


Amylase   


 


Lipase   161 H 


 


TSH   


 


Free T4   


 


Urine WBC (Auto)   


 


Urine HCG, Qual  Positive A  














  18





  09:31 09:51 09:51


 


Hgb   


 


MCHC   


 


Mono % (Auto)   


 


Mono #   


 


Seg Neutrophils %   


 


Seg Neutrophils #   


 


Sodium   


 


Potassium   


 


Chloride   


 


Carbon Dioxide   


 


BUN   


 


Creatinine   


 


Glucose   


 


Calcium   


 


Total Bilirubin   


 


Direct Bilirubin   


 


AST   


 


ALT   


 


Total Protein   


 


Albumin   


 


Amylase  135 H  


 


Lipase  168 H  


 


TSH    0.005 L


 


Free T4   6.38 H 


 


Urine WBC (Auto)   


 


Urine HCG, Qual   














  18





  10:46 10:46 06:00


 


Hgb   


 


MCHC  35 H  


 


Mono % (Auto)   


 


Mono #   


 


Seg Neutrophils %  74.1 H  


 


Seg Neutrophils #  7.9 H  


 


Sodium   135 L 


 


Potassium   2.4 L*  2.8 L*


 


Chloride   


 


Carbon Dioxide   17 L 


 


BUN   5 L 


 


Creatinine   0.3 L 


 


Glucose   


 


Calcium   8.0 L 


 


Total Bilirubin   


 


Direct Bilirubin    0.8 H


 


AST   75 H  66 H


 


ALT   92 H  78 H


 


Total Protein   5.8 L D  5.6 L


 


Albumin   3.2 L  3.0 L


 


Amylase   


 


Lipase    148 H


 


TSH   


 


Free T4   


 


Urine WBC (Auto)   


 


Urine HCG, Qual   








 Laboratory Results - last 24 hr











  18





  06:00


 


Potassium  2.8 L*


 


Total Bilirubin  1.20


 


Direct Bilirubin  0.8 H


 


Indirect Bilirubin  0.4


 


AST  66 H


 


ALT  78 H


 


Alkaline Phosphatase  98


 


Total Protein  5.6 L


 


Albumin  3.0 L


 


Albumin/Globulin Ratio  1.2


 


Lipase  148 H

## 2018-01-05 NOTE — PROGRESS NOTE
Assessment and Plan





- Patient Problems


(1) Cholelithiasis affecting pregnancy in first trimester, antepartum


Current Visit: Yes   Status: Acute   


Plan to address problem: 


Surgical consult done. Dr. Vallejo recommended no intervention at this time. 

Patient will follow with him as outpatient.








(2) Hyperemesis gravidarum


Current Visit: Yes   Status: Acute   


Plan to address problem: 


Continue zofran PRN.


IV hydration. Monitor electrolytes.


Advance diet today.








(3) Elevated LFTs


Current Visit: Yes   Status: Acute   


Plan to address problem: 


Hepatitis A, B negative. Hep C panels pending.








(4) Hypokalemia


Current Visit: Yes   Status: Acute   


Plan to address problem: 


K-rider was given. Potassium still low. K-rider potassium is to be given today. 

Will F/U level affter 40 mEq x 2.








(5) Urinary symptom or sign


Current Visit: Yes   Status: Acute   


Plan to address problem: 


Urine culture ordered.








(6) 13 weeks gestation of pregnancy


Current Visit: Yes   Status: Acute   


Plan to address problem: 


Prenatal labs were done at the clinic.








(7) Hyperthyroidism affecting pregnancy


Current Visit: Yes   Status: Acute   


Plan to address problem: 


 mg PO TID ordered.


MFM consult ordered.








Subjective





- Subjective


Date of service: 18


Principal diagnosis: vomiting, hyperthyroidism


Interval history: 





Patient is a 40 year old , LMP 10/11/17 who is at 13 weeks gestation who 

was admitted early yesterday for vominting, inability to tolerated PO intake, 12

-lbs weight loss, RUQ pain which started 5 days earlier. She denied any fever, 

pelvic pain, or vaginal bleeding. She just started PNC at [facility] 

[facility]. She went  for her routine visit on 17 with the above complaints. 

She was sent to the ER for evaluation. In the ER, she was found to be very 

dehydrated, vomiting, elevated liver enzymes and low potassium. She was given 

IV fluid, K+ via K-rider, zofran. Abdominal sonogram showed: + gallstone with 

gallbladder sludge. Surgical consult was done.


OB sono showed a viable fetus at 12 weeks and 6 days.


On exam: + TN in her RUQ and right upper back, no pelvic TN.


Today, her pain has decerased in severity and she feels hungry.


She has been tachycardic. Her pulse has been 105-110's. Thyroid profile showed 

hyperthyroidism. 


PTU ordered.


M consult called.





Objective





- Vital Signs


Latest vital signs: 


 Vital Signs











  Temp Pulse Resp BP Pulse Ox


 


 18 08:30  97.6 F  95 H  18  102/50 


 


 18 04:30  97.8 F  99 H  18  100/50  99


 


 18 03:37    18  


 


 18 23:55  97.5 F L  100 H  18  104/53  98


 


 18 20:25  97.5 F L  108 H  18  102/57  98


 


 18 16:03  98.6 F  106 H  18  108/56  97


 


 18 12:12  98.8 F  120 H  18  111/56  97








 Intake and Output











 18





 23:59 07:59 15:59


 


Intake Total 1300 1000 100


 


Balance 1300 1000 100


 


Intake:   


 


  IV 1300 1000 100


 


    KCL 10MEQ/100ML 10 meq In 300  





    100 ml @ 100 mls/hr IV   





    Q1H ISABELA Rx#:[id number]   


 


    KCL 10MEQ/100ML 10 meq In   100





    100 ml @ 100 mls/hr IV   





    Q1H ISABELA Rx#:[id number]   


 


    NaCl 0.9% 1000 ml 1,000 1000 1000 





    ml @ 125 mls/hr IV AS   





    DIRECT ISABELA Rx#:[id number]   


 


Other:   


 


  Voiding Method Toilet  


 


  # Voids   


 


    Void 900 1 














- Exam


Cardiovascular: Present: Normal S1, Normal S2, Other (tachycardia)


Lungs: Present: Clear to auscultation


Deep Tendon Reflex Grade: Normal +2





- Labs


Labs: 


 Abnormal lab results











  18 Range/Units





  09:31 09:51 09:51 


 


MCHC     (30-34)  %


 


Seg Neutrophils %     (40.0-70.0)  %


 


Seg Neutrophils #     (1.8-7.7)  K/mm3


 


Sodium     (137-145)  mmol/L


 


Potassium     (3.6-5.0)  mmol/L


 


Carbon Dioxide     (22-30)  mmol/L


 


BUN     (7-17)  mg/dL


 


Creatinine     (0.7-1.2)  mg/dL


 


Calcium     (8.4-10.2)  mg/dL


 


Direct Bilirubin     (0-0.2)  mg/dL


 


AST     (5-40)  units/L


 


ALT     (7-56)  units/L


 


Total Protein     (6.3-8.2)  g/dL


 


Albumin     (3.9-5)  g/dL


 


Amylase  135 H    ()  units/L


 


Lipase  168 H    (13-60)  units/L


 


TSH    0.005 L  (0.270-4.200)  mlU/mL


 


Free T4   6.38 H   (0.76-1.46)  ng/dL














  18 Range/Units





  10:46 10:46 06:00 


 


MCHC  35 H    (30-34)  %


 


Seg Neutrophils %  74.1 H    (40.0-70.0)  %


 


Seg Neutrophils #  7.9 H    (1.8-7.7)  K/mm3


 


Sodium   135 L   (137-145)  mmol/L


 


Potassium   2.4 L*  2.8 L*  (3.6-5.0)  mmol/L


 


Carbon Dioxide   17 L   (22-30)  mmol/L


 


BUN   5 L   (7-17)  mg/dL


 


Creatinine   0.3 L   (0.7-1.2)  mg/dL


 


Calcium   8.0 L   (8.4-10.2)  mg/dL


 


Direct Bilirubin    0.8 H  (0-0.2)  mg/dL


 


AST   75 H  66 H  (5-40)  units/L


 


ALT   92 H  78 H  (7-56)  units/L


 


Total Protein   5.8 L D  5.6 L  (6.3-8.2)  g/dL


 


Albumin   3.2 L  3.0 L  (3.9-5)  g/dL


 


Amylase     ()  units/L


 


Lipase    148 H  (13-60)  units/L


 


TSH     (0.270-4.200)  mlU/mL


 


Free T4     (0.76-1.46)  ng/dL

## 2018-01-05 NOTE — CONSULTATION
History of Present Illness


Reason for consult: other (Patient is a 40 year old , LMP 10/11/17 who 

is at 13 weeks gestation who was admitted early yesterday for vominting, 

inability to tolerated PO intake, 12-lbs weight loss, RUQ pain which started 5 

days earlier. She denied any fever, pelvic pain, or vaginal bleeding. She just 

started PNC at [facility]. She went  for her routine visit on  with the above complaints. She was sent to the ER for evaluation. Today's 

assessment  vomiting times 1 episode , elevated ( however downward trend )liver 

enzymes and low potassium. She was given IV fluid, K+ via K-rider, zofran. 

Abdominal sonogram showed: + gallstone with gallbladder sludge. Surgical 

consult was perform which prefers to defer surgery until 2nd trimester . 

Patient was tachycardiac Thyroid profile showed hyperthyroidism. Primary Ob 

placed pt on PTU )





Past History





- Obstetrical History


: 2





Medications and Allergies


 Allergies











Allergy/AdvReac Type Severity Reaction Status Date / Time


 


Penicillins AdvReac  Rash Verified 18 13:31











 Home Medications











 Medication  Instructions  Recorded  Confirmed  Last Taken  Type


 


Ferrous Sulfate [Feosol 325 MG tab] 325 mg PO BID #60 tablet 16 

Unknown Rx


 


Ibuprofen [Motrin 800 MG tab] 800 mg PO Q8HR PRN #30 tablet 16 

Unknown Rx


 


Nitrofurantoin Mono/M-Cryst 1 tab PO Q12HR 16 Unknown History





[Macrobid CAP]     


 


Ondansetron [Zofran ODT TAB] 1 tab PO Q8HR 16 Unknown History


 


oxyCODONE /ACETAMINOPHEN [Percocet 1 tab PO Q6HR PRN #30 tablet 16 Unknown Rx





5/325]     


 


Cefuroxime [Ceftin] 250 mg PO Q12H #10 tablet 17 Unknown Rx


 


Famotidine [Pepcid] 20 mg PO QDAY #20 tablet 17 Unknown Rx


 


Ondansetron [Zofran Odt] 4 mg PO Q6H PRN #7 tab.rapdis 17 

Unknown Rx











Active Meds: 


Active Medications





Sodium Chloride (Nacl 0.9% 1000 Ml)  1,000 mls @ 125 mls/hr IV AS DIRECT [facility]


   Last Admin: 18 08:49 Dose:  125 mls/hr


Morphine Sulfate (Morphine)  4 mg IV Q8H PRN


   PRN Reason: Pain , Severe (7-10)


   Last Admin: 18 03:37 Dose:  4 mg


Multivitamins/Iron/Calcium (Prenatal Vitamin)  1 each PO QDAY [facility]


   Last Admin: 18 11:19 Dose:  1 each


Ondansetron HCl (Zofran)  4 mg IV Q8HR PRN


   PRN Reason: Nausea


   Last Admin: 18 08:18 Dose:  4 mg


Propylthiouracil (Propylthiouracil)  100 mg PO TID [facility]


   Last Admin: 18 11:00 Dose:  100 mg











Review of Systems


Constitutional: weight loss, no fever, no chills


Eyes: no blurred vision, no photophobia


Ears, nose, mouth and throat: no headache, no vertigo


Cardiovascular: no chest pain, no palpitations


Respiratory: no cough


Breasts: deferred


Gastrointestinal: abdominal pain (mild RUQ), nausea, vomiting (has improved 

....is hungry now), no belching


Genitourinary: no vaginal bleeding, no pelvic pain


Rectal Exam: deferred


Musculoskeletal: no muscle cramps


Integumentary: no rash


Neurological: no headaches


Psychiatric: no anxiety


Endocrine: other, no excessive sweating


Allergic/Immunologic: no wheezing





- Vital Signs


Vital signs: 


 Vital Signs











Temp Pulse Resp BP Pulse Ox


 


 97.4 F L  128 H  18   108/77   99 


 


 18 14:42  18 14:42  18 14:42  18 14:42  18 14:42








 











Temp Pulse Resp BP Pulse Ox


 


 97.6 F   115 H  20   119/66   99 


 


 18 15:01  18 15:01  18 15:01  18 15:01  18 15:01














- Physical Exam


Breasts: Positive: deferred


Cardiovascular: No murmurs, Other


Lungs: Positive: Normal air movement


Abdomen: Positive: tenderness (mildy tender RUQ).  Negative: distention, 

guarding, rigidity


Uterus: Negative: tender


Extremities: Negative: tenderness


Deep Tendon Reflex Grade: Normal +2





- Obstetrical


FHR: other (1/3/18  +FHT noted on U/S )





Results


Result Diagrams: 


 18 10:46





 18 06:00


 Abnormal lab results











  18 Range/Units





  06:00 


 


Potassium  2.8 L*  (3.6-5.0)  mmol/L


 


Direct Bilirubin  0.8 H  (0-0.2)  mg/dL


 


AST  66 H  (5-40)  units/L


 


ALT  78 H  (7-56)  units/L


 


Total Protein  5.6 L  (6.3-8.2)  g/dL


 


Albumin  3.0 L  (3.9-5)  g/dL


 


Lipase  148 H  (13-60)  units/L








All other labs normal.





Ultrasound: report reviewed (See chart for full report OB U/S : + FHT noted.  

Left ovarian cyst noted )





Assessment and Plan





A. 


1.  IUP at 13.0 weeks 


2.  AMA


3.  Cholelithiasis affecting pregnancy in first trimester, antepartum S/P 

surgery consult


4.  Hyperemesis gravidarum


5.  Elevated LFTs- downward trend


Hepatitis A, B -Negative 


6.  Hypokalemia-under K+ supplement in progress 


7.  Urinary symptom or sign awaiting urine culture 


8.  Hyperthroidism placed under PTU by Primary OB 


9.  Left Ovarian cyst 


  








P


1. Continue in patient management 


2. Continue zofran PRN.


3. IV hydration. Monitor electrolytes.


4. In agreement with current plan of care 


5. At 13 weeks , discharge consideration as per OB and surgery

## 2018-01-05 NOTE — PROGRESS NOTE
Assessment and Plan





- Patient Problems


(1) Cholelithiasis affecting pregnancy in first trimester, antepartum


Current Visit: Yes   Status: Acute   


Plan to address problem: 


Will continue with current plan.  No indication for surgery at this time.  Will 

follow-up in the office.  Labs are trending down.  Patient is stable and 

symptoms are improving.








(2) Dehydration during pregnancy


Current Visit: Yes   Status: Acute   


Plan to address problem: 


Clinically appears better today.  Continue current plan.  Advanced status 

tolerated.








(3) Elevated LFTs


Current Visit: Yes   Status: Acute   


Plan to address problem: 


Resolving as expected.  Most likely related to passing stone.








(4) Hypokalemia


Current Visit: Yes   Status: Acute   


Plan to address problem: 


Agree with replacement plan per primary team.








Subjective


Date of service: 01/05/18


Patient Reports: Positive: feels better, tolerating liquids well, vomiting (

times 1 today), other (no pain in the right upper quadrant anymore)





Objective


 Vital Signs - 12hr











  01/04/18 01/05/18 01/05/18





  23:55 03:37 04:30


 


Temperature 97.5 F L  97.8 F


 


Pulse Rate 100 H  99 H


 


Respiratory 18 18 18





Rate   


 


Blood Pressure 104/53  100/50





[Right]   


 


O2 Sat by Pulse 98  99





Oximetry   














  01/05/18





  08:30


 


Temperature 97.6 F


 


Pulse Rate 95 H


 


Respiratory 18





Rate 


 


Blood Pressure 102/50





[Right] 


 


O2 Sat by Pulse 





Oximetry 














- General physical appearance


well developed, well nourished, no distress





- Eyes


normal occular movement





- Respiratory


normal expansion, normal respiratory effort





- Abdomen


soft, not tender, bowel sounds normal, not distended, not rebound, not guarding

, not rigid





- Psychiatric


speech is normal





- Labs





 01/04/18 10:46





 01/05/18 06:00


 Diabetes panel











  01/04/18 01/05/18 Range/Units





  10:46 06:00 


 


Sodium  135 L   (137-145)  mmol/L


 


Potassium  2.4 L*  2.8 L*  (3.6-5.0)  mmol/L


 


Chloride  98.9   ()  mmol/L


 


Carbon Dioxide  17 L   (22-30)  mmol/L


 


BUN  5 L   (7-17)  mg/dL


 


Creatinine  0.3 L   (0.7-1.2)  mg/dL


 


Glucose  81   ()  mg/dL


 


Calcium  8.0 L   (8.4-10.2)  mg/dL


 


AST  75 H  66 H  (5-40)  units/L


 


ALT  92 H  78 H  (7-56)  units/L


 


Alkaline Phosphatase  91  98  ()  units/L


 


Total Protein  5.8 L D  5.6 L  (6.3-8.2)  g/dL


 


Albumin  3.2 L  3.0 L  (3.9-5)  g/dL








 Thyroid panel











  01/04/18 Range/Units





  09:51 


 


TSH  0.005 L  (0.270-4.200)  mlU/mL








 Calcium panel











  01/04/18 01/05/18 Range/Units





  10:46 06:00 


 


Calcium  8.0 L   (8.4-10.2)  mg/dL


 


Albumin  3.2 L  3.0 L  (3.9-5)  g/dL








 Pituitary panel











  01/04/18 01/04/18 01/05/18 Range/Units





  09:51 10:46 06:00 


 


Sodium   135 L   (137-145)  mmol/L


 


Potassium   2.4 L*  2.8 L*  (3.6-5.0)  mmol/L


 


Chloride   98.9   ()  mmol/L


 


Carbon Dioxide   17 L   (22-30)  mmol/L


 


BUN   5 L   (7-17)  mg/dL


 


Creatinine   0.3 L   (0.7-1.2)  mg/dL


 


Glucose   81   ()  mg/dL


 


Calcium   8.0 L   (8.4-10.2)  mg/dL


 


TSH  0.005 L    (0.270-4.200)  mlU/mL








 Adrenal panel











  01/04/18 01/05/18 Range/Units





  10:46 06:00 


 


Sodium  135 L   (137-145)  mmol/L


 


Potassium  2.4 L*  2.8 L*  (3.6-5.0)  mmol/L


 


Chloride  98.9   ()  mmol/L


 


Carbon Dioxide  17 L   (22-30)  mmol/L


 


BUN  5 L   (7-17)  mg/dL


 


Creatinine  0.3 L   (0.7-1.2)  mg/dL


 


Glucose  81   ()  mg/dL


 


Calcium  8.0 L   (8.4-10.2)  mg/dL


 


Total Bilirubin  1.20  1.20  (0.1-1.2)  mg/dL


 


AST  75 H  66 H  (5-40)  units/L


 


ALT  92 H  78 H  (7-56)  units/L


 


Alkaline Phosphatase  91  98  ()  units/L


 


Total Protein  5.8 L D  5.6 L  (6.3-8.2)  g/dL


 


Albumin  3.2 L  3.0 L  (3.9-5)  g/dL

## 2018-01-06 NOTE — PROGRESS NOTE
Assessment and Plan





- Patient Problems


(1) 13 weeks gestation of pregnancy


Onset Date: 18   Current Visit: Yes   Status: Acute   





(2) Cholelithiasis affecting pregnancy in first trimester, antepartum


Onset Date: 18   Current Visit: Yes   Status: Acute   





(3) Dehydration during pregnancy


Onset Date: 18   Current Visit: Yes   Status: Acute   





(4) Elevated LFTs


Onset Date: 18   Current Visit: Yes   Status: Acute   





(5) Hyperemesis gravidarum


Onset Date: 18   Current Visit: Yes   Status: Acute   





(6) Hyperthyroidism affecting pregnancy


Onset Date: 18   Current Visit: Yes   Status: Acute   


Qualifiers: 


   Trimester: first trimester   Qualified Code(s): O99.281 - Endocrine, 

nutritional and metabolic diseases complicating pregnancy, first trimester; 

E05.90 - Thyrotoxicosis, unspecified without thyrotoxic crisis or storm; E05.90 

- Thyrotoxicosis, unspecified without thyrotoxic crisis or storm; E05.90 - 

Thyrotoxicosis, unspecified without thyrotoxic crisis or storm   





(7) Hypokalemia


Onset Date: 18   Current Visit: Yes   Status: Acute   





Subjective





- Subjective


Date of service: 18


Principal diagnosis: IUP @ 13 weeks, vomiting, hyperthyroidism


Interval history: 





Patient is a 40 year old , LMP 10/11/17 who is at 13+ weeks gestation 

who was admitted for vomiting, inability to tolerated PO intake, 12-lbs weight 

loss, RUQ pain which started 5 days earlier. She denied any fever, pelvic pain, 

or vaginal bleeding. She had just started PNC at [facility]. She 

went  for her routine visit on 17 with the above complaints, and was sent 

to the ER for evaluation. In the ER, she was found to be very dehydrated, 

vomiting, elevated liver enzymes and low potassium. She was given IV fluid, K+ 

via K-rider, zofran. Abdominal sonogram showed: + gallstone with gallbladder 

sludge. Surgical consult was done and recommended no intervention at present as 

patient is afebrile


OB sono showed a viable fetus at 12 weeks and 6 days.


On exam: + TN in her RUQ and right upper back, no pelvic TN.


Today, her pain has decreased in severity and she feels hungry. Her LFT's have 

decreased.


She has been tachycardic and was found to have hyperthyroidism. 


PTU has been given.


MFM consult done and agreed with current management.


This morning she is still nauseated and vomiting. Labs today are pending.


Patient reports: no new complaints, no vaginal bleeding





Objective





- Vital Signs


Vital Signs: 


 Vital Signs - 12hr











  18





  00:23 05:15 07:58


 


Temperature 98.5 F 98.6 F 98.2 F


 


Pulse Rate 92 H 90 92 H


 


Respiratory 18 20 18





Rate   


 


Blood Pressure 103/58 100/54 97/55





[Right]   














  18





  08:10 08:23


 


Temperature  


 


Pulse Rate  


 


Respiratory 20 20





Rate  


 


Blood Pressure  





[Right]  














- Exam


Cardiovascular: Regular rate


Lungs: Clear to auscultation


Abdomen: Present: normal appearance, soft





- Labs


Labs: 


 Abnormal Labs











  18





  15:45 15:45 16:05


 


Hgb  15.0 H  


 


MCHC  36 H  


 


Mono % (Auto)  11.1 H  


 


Mono #  0.9 H  


 


Seg Neutrophils %   


 


Seg Neutrophils #   


 


Sodium   134 L 


 


Potassium   2.6 L* 


 


Chloride   91.0 L 


 


Carbon Dioxide   


 


BUN   


 


Creatinine   0.4 L 


 


Glucose   114 H 


 


Calcium   


 


Total Bilirubin   1.50 H 


 


Direct Bilirubin   


 


AST   110 H 


 


ALT   118 H 


 


Total Protein   


 


Albumin   


 


Amylase   


 


Lipase   


 


TSH   


 


Free T4   


 


Urine WBC (Auto)    15.0 H


 


Urine HCG, Qual   














  18





  23:59 01:54 01:54


 


Hgb   


 


MCHC   


 


Mono % (Auto)   


 


Mono #   


 


Seg Neutrophils %   


 


Seg Neutrophils #   


 


Sodium    135 L


 


Potassium    2.4 L*


 


Chloride   


 


Carbon Dioxide    19 L


 


BUN    6 L


 


Creatinine    0.3 L


 


Glucose   


 


Calcium    7.9 L D


 


Total Bilirubin   


 


Direct Bilirubin   


 


AST   


 


ALT   


 


Total Protein   


 


Albumin   


 


Amylase   


 


Lipase   161 H 


 


TSH   


 


Free T4   


 


Urine WBC (Auto)   


 


Urine HCG, Qual  Positive A  














  18





  09:31 09:51 09:51


 


Hgb   


 


MCHC   


 


Mono % (Auto)   


 


Mono #   


 


Seg Neutrophils %   


 


Seg Neutrophils #   


 


Sodium   


 


Potassium   


 


Chloride   


 


Carbon Dioxide   


 


BUN   


 


Creatinine   


 


Glucose   


 


Calcium   


 


Total Bilirubin   


 


Direct Bilirubin   


 


AST   


 


ALT   


 


Total Protein   


 


Albumin   


 


Amylase  135 H  


 


Lipase  168 H  


 


TSH    0.005 L


 


Free T4   6.38 H 


 


Urine WBC (Auto)   


 


Urine HCG, Qual   














  18





  10:46 10:46 06:00


 


Hgb   


 


MCHC  35 H  


 


Mono % (Auto)   


 


Mono #   


 


Seg Neutrophils %  74.1 H  


 


Seg Neutrophils #  7.9 H  


 


Sodium   135 L 


 


Potassium   2.4 L*  2.8 L*


 


Chloride   


 


Carbon Dioxide   17 L 


 


BUN   5 L 


 


Creatinine   0.3 L 


 


Glucose   


 


Calcium   8.0 L 


 


Total Bilirubin   


 


Direct Bilirubin    0.8 H


 


AST   75 H  66 H


 


ALT   92 H  78 H


 


Total Protein   5.8 L D  5.6 L


 


Albumin   3.2 L  3.0 L


 


Amylase   


 


Lipase    148 H


 


TSH   


 


Free T4   


 


Urine WBC (Auto)   


 


Urine HCG, Qual   








 Laboratory Results - last 24 hr











  18





  15:08


 


Magnesium  1.70

## 2018-01-06 NOTE — CONSULTATION
History of Present Illness


Consult date: 18


Requesting physician: BRANDEN CERON


Reason for consult: medical complication, other (Hyperemesis.)


History of present illness: 





Patient is a 40 year old , LMP 10/11/17 who is at 13 weeks gestation who 

was admitted early yesterday for vomiting, inability to tolerated PO intake, 12-

lbs weight loss, RUQ pain which started 5 days earlier. Patient reports: no new 

complaints, no vaginal bleeding





She denied any fever, pelvic pain, or vaginal bleeding. She just started PNC at 

[facility]. She went  for her routine visit on 17 with the 

above complaints. She was sent to the ER for evaluation. 


Recent assessment included elevated ( however downward trend )liver enzymes and 

low potassium. 





She was given IV fluid, K+ via K-rider, zofran. Abdominal sonogram showed: + 

gallstone with gallbladder sludge. 


Surgical consult was perform which prefers to defer surgery until 2nd trimester 

. Patient was tachycardiac Thyroid profile showed hyperthyroidism. Primary Ob 

placed pt on PTU )





She has been tachycardic and was found to have hyperthyroidism. 





PTU has been given.





This morning she is still nauseated and vomiting. 


Labs:  


   WBC: 10.6   HGB: 12.0   HCT: 34.1   PLT: 184


   K: 2.4   BUN:  3      Creat: 0.3   Gluc: 89.





Past History





- Obstetrical History


: 2





Medications and Allergies


 Allergies











Allergy/AdvReac Type Severity Reaction Status Date / Time


 


Penicillins AdvReac  Rash Verified 18 13:31











 Home Medications











 Medication  Instructions  Recorded  Confirmed  Last Taken  Type


 


Ferrous Sulfate [Feosol 325 MG tab] 325 mg PO BID #60 tablet 16 

Unknown Rx


 


Ibuprofen [Motrin 800 MG tab] 800 mg PO Q8HR PRN #30 tablet 16 

Unknown Rx


 


Nitrofurantoin Mono/M-Cryst 1 tab PO Q12HR 16 Unknown History





[Macrobid CAP]     


 


Ondansetron [Zofran ODT TAB] 1 tab PO Q8HR 16 Unknown History


 


oxyCODONE /ACETAMINOPHEN [Percocet 1 tab PO Q6HR PRN #30 tablet 16 Unknown Rx





5/325]     


 


Cefuroxime [Ceftin] 250 mg PO Q12H #10 tablet 17 Unknown Rx


 


Famotidine [Pepcid] 20 mg PO QDAY #20 tablet 17 Unknown Rx


 


Ondansetron [Zofran Odt] 4 mg PO Q6H PRN #7 tab.rapdis 17 

Unknown Rx











Active Meds: 


Active Medications





Sodium Chloride (Nacl 0.9% 1000 Ml)  1,000 mls @ 125 mls/hr IV AS DIRECT [facility]


   Last Admin: 18 13:32 Dose:  125 mls/hr


Potassium Chloride (Kcl 10meq/100ml)  10 meq in 100 mls @ 100 mls/hr IV Q1H PRN


   PRN Reason: Potassium 2.6-2.9 mEq/L


   Last Admin: 18 16:30 Dose:  100 mls/hr


Morphine Sulfate (Morphine)  4 mg IV Q8H PRN


   PRN Reason: Pain , Severe (7-10)


   Last Admin: 18 08:23 Dose:  4 mg


Multivitamins/Iron/Calcium (Prenatal Vitamin)  1 each PO QDAY [facility]


   Last Admin: 18 10:18 Dose:  Not Given


Ondansetron HCl (Zofran)  4 mg IV Q8HR PRN


   PRN Reason: Nausea


   Last Admin: 18 15:15 Dose:  4 mg


Propylthiouracil (Propylthiouracil)  100 mg PO TID [facility]


   Last Admin: 18 16:00 Dose:  100 mg











- Vital Signs


Vital signs: 


 Vital Signs











Temp Pulse Resp BP Pulse Ox


 


 97.4 F L  128 H  18   108/77   99 


 


 18 14:42  18 14:42  18 14:42  18 14:42  18 14:42








 











Temp Pulse Resp BP Pulse Ox


 


 98.4 F   97 H  18   109/51   99 


 


 18 17:25  18 17:25  18 17:25  18 17:25  18 21:22














Results


Result Diagrams: 


 18 10:46





 18 11:02


 Abnormal lab results











  18 Range/Units





  11:02 


 


Potassium  2.4 L*  (3.6-5.0)  mmol/L


 


Carbon Dioxide  20 L  (22-30)  mmol/L


 


BUN  3 L  (7-17)  mg/dL


 


Creatinine  0.3 L  (0.7-1.2)  mg/dL


 


Calcium  7.8 L  (8.4-10.2)  mg/dL


 


AST  82 H  (5-40)  units/L


 


ALT  87 H  (7-56)  units/L


 


Total Protein  5.8 L  (6.3-8.2)  g/dL


 


Albumin  3.0 L  (3.9-5)  g/dL


 


Lipase  187 H  (13-60)  units/L








All other labs normal.








Assessment and Plan





ASSESSMENT:


1.   IUP at 13.0 weeks 


2.   AMA


3.   Cholelithiasis affecting pregnancy in first trimester, antepartum S/P 

surgery consult


4.   Hyperemesis gravidarum


5.   Elevated LFTs- downward trend


6.   Hepatitis A, B -Negative 


7.   Hypokalemia-under K+ supplement in progress 


8.   Urinary symptom or sign awaiting urine culture 


9.   Hyperthroidism placed under PTU by Primary OB 


10.   Left Ovarian cyst 


  








RECOMMENDATIONS:


1.   Continue in patient management 


2.   Continue zofran PRN.


3.   IV hydration. Monitor electrolytes.


4.   In agreement with current plan of care 


5.   At 13 weeks , discharge consideration as per OB and surgery

## 2018-01-07 NOTE — PROGRESS NOTE
Assessment and Plan





- Patient Problems


(1) 13 weeks gestation of pregnancy


Onset Date: 18   Current Visit: Yes   Status: Acute   





(2) Cholelithiasis affecting pregnancy in first trimester, antepartum


Onset Date: 18   Current Visit: Yes   Status: Acute   





(3) Dehydration during pregnancy


Onset Date: 18   Current Visit: Yes   Status: Acute   





(4) Elevated LFTs


Onset Date: 18   Current Visit: Yes   Status: Acute   





(5) Hyperemesis gravidarum


Onset Date: 18   Current Visit: Yes   Status: Acute   





(6) Hyperthyroidism affecting pregnancy


Onset Date: 18   Current Visit: Yes   Status: Acute   


Qualifiers: 


   Trimester: first trimester   Qualified Code(s): O99.281 - Endocrine, 

nutritional and metabolic diseases complicating pregnancy, first trimester; 

E05.90 - Thyrotoxicosis, unspecified without thyrotoxic crisis or storm; E05.90 

- Thyrotoxicosis, unspecified without thyrotoxic crisis or storm; E05.90 - 

Thyrotoxicosis, unspecified without thyrotoxic crisis or storm   





(7) Hypokalemia


Onset Date: 18   Current Visit: Yes   Status: Acute   





Subjective





- Subjective


Date of service: 18


Principal diagnosis: IUP @ 13 weeks, vomiting, hyperthyroidism


Interval history: 





Patient is a 40 year old , LMP 10/11/17 who is at 13+ weeks gestation 

who was admitted for vomiting, inability to tolerated PO intake, 12-lbs weight 

loss, RUQ pain which started 5 days earlier. She denied any fever, pelvic pain, 

or vaginal bleeding. She had just started PNC at [facility]. She 

went  for her routine visit on 17 with the above complaints, and was sent 

to the ER for evaluation. In the ER, she was found to be very dehydrated, 

vomiting, elevated liver enzymes and low potassium. She was given IV fluid, K+ 

via K-rider, zofran. Abdominal sonogram showed: + gallstone with gallbladder 

sludge. Surgical consult was done and recommended no intervention at present as 

patient is afebrile


OB sono showed a viable fetus at 12 weeks and 6 days.


On exam: + TN in her RUQ and right upper back, no pelvic TN.


Today, her pain has decreased in severity and she feels hungry. Her LFT's have 

decreased.


She has been tachycardic and was found to have hyperthyroidism. 


PTU has been given.


MFM consult done and agreed with current management.


This morning she is still nauseated and vomiting. Labs yesterday showed an 

increase in LFT's and Lipase.


Patient reports: no new complaints, no vaginal bleeding





Objective





- Vital Signs


Vital Signs: 


 Vital Signs - 12hr











  18





  00:47 04:41 09:17


 


Temperature 98.4 F 97.9 F 97.3 F L


 


Pulse Rate 94 H 86 99 H


 


Respiratory 20 18 18





Rate   


 


Blood Pressure 105/51 100/52 98/54


 


O2 Sat by Pulse 97 97 99





Oximetry   














- Exam


Cardiovascular: Regular rate


Lungs: Clear to auscultation


Abdomen: Present: normal appearance





- Labs


Labs: 


 Abnormal Labs











  18





  15:45 15:45 16:05


 


Hgb  15.0 H  


 


MCHC  36 H  


 


Mono % (Auto)  11.1 H  


 


Mono #  0.9 H  


 


Seg Neutrophils %   


 


Seg Neutrophils #   


 


Sodium   134 L 


 


Potassium   2.6 L* 


 


Chloride   91.0 L 


 


Carbon Dioxide   


 


BUN   


 


Creatinine   0.4 L 


 


Glucose   114 H 


 


Calcium   


 


Magnesium   


 


Total Bilirubin   1.50 H 


 


Direct Bilirubin   


 


AST   110 H 


 


ALT   118 H 


 


Total Protein   


 


Albumin   


 


Amylase   


 


Lipase   


 


TSH   


 


Free T4   


 


Urine WBC (Auto)    15.0 H


 


Urine HCG, Qual   














  18





  23:59 01:54 01:54


 


Hgb   


 


MCHC   


 


Mono % (Auto)   


 


Mono #   


 


Seg Neutrophils %   


 


Seg Neutrophils #   


 


Sodium    135 L


 


Potassium    2.4 L*


 


Chloride   


 


Carbon Dioxide    19 L


 


BUN    6 L


 


Creatinine    0.3 L


 


Glucose   


 


Calcium    7.9 L D


 


Magnesium   


 


Total Bilirubin   


 


Direct Bilirubin   


 


AST   


 


ALT   


 


Total Protein   


 


Albumin   


 


Amylase   


 


Lipase   161 H 


 


TSH   


 


Free T4   


 


Urine WBC (Auto)   


 


Urine HCG, Qual  Positive A  














  18





  09:31 09:51 09:51


 


Hgb   


 


MCHC   


 


Mono % (Auto)   


 


Mono #   


 


Seg Neutrophils %   


 


Seg Neutrophils #   


 


Sodium   


 


Potassium   


 


Chloride   


 


Carbon Dioxide   


 


BUN   


 


Creatinine   


 


Glucose   


 


Calcium   


 


Magnesium   


 


Total Bilirubin   


 


Direct Bilirubin   


 


AST   


 


ALT   


 


Total Protein   


 


Albumin   


 


Amylase  135 H  


 


Lipase  168 H  


 


TSH    0.005 L


 


Free T4   6.38 H 


 


Urine WBC (Auto)   


 


Urine HCG, Qual   














  18





  10:46 10:46 06:00


 


Hgb   


 


MCHC  35 H  


 


Mono % (Auto)   


 


Mono #   


 


Seg Neutrophils %  74.1 H  


 


Seg Neutrophils #  7.9 H  


 


Sodium   135 L 


 


Potassium   2.4 L*  2.8 L*


 


Chloride   


 


Carbon Dioxide   17 L 


 


BUN   5 L 


 


Creatinine   0.3 L 


 


Glucose   


 


Calcium   8.0 L 


 


Magnesium   


 


Total Bilirubin   


 


Direct Bilirubin    0.8 H


 


AST   75 H  66 H


 


ALT   92 H  78 H


 


Total Protein   5.8 L D  5.6 L


 


Albumin   3.2 L  3.0 L


 


Amylase   


 


Lipase    148 H


 


TSH   


 


Free T4   


 


Urine WBC (Auto)   


 


Urine HCG, Qual   














  18





  11:02 05:19 10:11


 


Hgb   


 


MCHC   


 


Mono % (Auto)   


 


Mono #   


 


Seg Neutrophils %   


 


Seg Neutrophils #   


 


Sodium   


 


Potassium  2.4 L*  2.7 L* 


 


Chloride   


 


Carbon Dioxide  20 L  


 


BUN  3 L  


 


Creatinine  0.3 L  


 


Glucose   


 


Calcium  7.8 L  


 


Magnesium    1.60 L


 


Total Bilirubin   


 


Direct Bilirubin   


 


AST  82 H  


 


ALT  87 H  


 


Total Protein  5.8 L  


 


Albumin  3.0 L  


 


Amylase   


 


Lipase  187 H  


 


TSH   


 


Free T4   


 


Urine WBC (Auto)   


 


Urine HCG, Qual   








 Laboratory Results - last 24 hr











  18





  09:31 05:19 10:11


 


Potassium   2.7 L* 


 


Magnesium    1.60 L


 


Hepatitis Be Antigen  Nonreactive

## 2018-01-08 NOTE — CONSULTATION
History of Present Illness


Consult date: 18


Requesting physician: POWER LOMAS


Reason for consult: medical complication (hyperemesis)


History of present illness: 





Patient is a 40 year old , LMP 10/11/17 who is at 13 weeks gestation who 

was admitted early yesterday for vomiting, inability to tolerated PO intake, 12-

lbs weight loss, RUQ pain which started 5 days earlier. Patient reports: no new 

complaints, no vaginal bleeding


She denied any fever, pelvic pain, or vaginal bleeding. She just started PNC at 

[facility]. She went  for her routine visit on 17 with the 

above complaints. She was sent to the ER for evaluation. 


Recent assessment included elevated ( however downward trend )liver enzymes and 

low potassium. 


PTU has been given.


This morning she is nauseated but no vomiting. 


Labs:  


   WBC: 10.6   HGB: 12.0   HCT: 34.1   PLT: 184


   K: 2.4   BUN:  3      Creat: 0.3   Gluc: 89.





Past History





- Obstetrical History


: 2





Medications and Allergies


 Allergies











Allergy/AdvReac Type Severity Reaction Status Date / Time


 


Penicillins AdvReac  Rash Verified 18 13:31











 Home Medications











 Medication  Instructions  Recorded  Confirmed  Last Taken  Type


 


Ferrous Sulfate [Feosol 325 MG tab] 325 mg PO BID #60 tablet 16 

Unknown Rx


 


Ibuprofen [Motrin 800 MG tab] 800 mg PO Q8HR PRN #30 tablet 16 

Unknown Rx


 


Nitrofurantoin Mono/M-Cryst 1 tab PO Q12HR 16 Unknown History





[Macrobid CAP]     


 


Ondansetron [Zofran ODT TAB] 1 tab PO Q8HR 16 Unknown History


 


oxyCODONE /ACETAMINOPHEN [Percocet 1 tab PO Q6HR PRN #30 tablet 16 Unknown Rx





5/325]     


 


Cefuroxime [Ceftin] 250 mg PO Q12H #10 tablet 17 Unknown Rx


 


Famotidine [Pepcid] 20 mg PO QDAY #20 tablet 17 Unknown Rx


 


Ondansetron [Zofran Odt] 4 mg PO Q6H PRN #7 tab.rapdis 17 

Unknown Rx











Active Meds: 


Active Medications





Diphenhydramine HCl (Benadryl)  25 mg IV Q6H PRN


   PRN Reason: Itching


   Last Admin: 18 14:15 Dose:  25 mg


Sodium Chloride (Nacl 0.9% 1000 Ml)  1,000 mls @ 125 mls/hr IV AS DIRECT [facility]


   Last Admin: 18 10:29 Dose:  125 mls/hr


Clindamycin HCl (Cleocin  300 Mg/50 Ml)  300 mg in 50 mls @ 100 mls/hr IV Q6H 

[facility]


   PRN Reason: Protocol


   Last Admin: 18 02:33 Dose:  100 mls/hr


Potassium Chloride (Kcl 10meq/100ml)  10 meq in 100 mls @ 100 mls/hr IV Q1H PRN


   PRN Reason: Potassium 3-3.5 mEq/L


   Last Admin: 18 01:16 Dose:  100 mls/hr


Potassium Chloride (Kcl 10meq/100ml)  10 meq in 100 mls @ 100 mls/hr IV Q1H PRN


   PRN Reason: Potassium 2.6-2.9 mEq/L


Morphine Sulfate (Morphine)  4 mg IV Q8H PRN


   PRN Reason: Pain , Severe (7-10)


   Last Admin: 18 10:41 Dose:  4 mg


Multivitamins/Iron/Calcium (Prenatal Vitamin)  1 each PO QDAY [facility]


   Last Admin: 18 14:43 Dose:  Not Given


Ondansetron HCl (Zofran)  8 mg IV Q6HR PRN


   PRN Reason: Nausea


   Last Admin: 18 10:49 Dose:  8 mg


Propylthiouracil (Propylthiouracil)  100 mg PO TID [facility]


   Last Admin: 18 08:13 Dose:  100 mg











- Vital Signs


Vital signs: 


 Vital Signs











Temp Pulse Resp BP Pulse Ox


 


 97.4 F L  128 H  18   108/77   99 


 


 18 14:42  18 14:42  18 14:42  18 14:42  18 14:42








 











Temp Pulse Resp BP Pulse Ox


 


 98.1 F   87   20   97/53   99 


 


 18 11:46  18 11:46  18 11:46  18 11:46  18 11:46














Results


Result Diagrams: 


 18 10:46





 18 12:54


 Abnormal lab results











  18 Range/Units





  12:54 


 


Potassium  2.9 L*  (3.6-5.0)  mmol/L


 


Carbon Dioxide  20 L  (22-30)  mmol/L


 


BUN  2 L  (7-17)  mg/dL


 


Creatinine  0.3 L  (0.7-1.2)  mg/dL


 


Glucose  128 H  ()  mg/dL


 


Calcium  8.1 L  (8.4-10.2)  mg/dL


 


AST  68 H  (5-40)  units/L


 


ALT  79 H  (7-56)  units/L


 


Total Protein  5.9 L  (6.3-8.2)  g/dL


 


Albumin  3.1 L  (3.9-5)  g/dL


 


Lipase  220 H  (13-60)  units/L








All other labs normal.








Assessment and Plan








ASSESSMENT:


1.   IUP at 13.0 weeks 


2.   AMA


3.   Cholelithiasis affecting pregnancy in first trimester, antepartum S/P 

surgery consult


4.   Hyperemesis gravidarum


5.   Elevated LFTs- downward trend


6.   Hepatitis A, B -Negative 


7.   Hypokalemia-under K+ supplement in progress 


8.   Urinary symptom or sign awaiting urine culture 


9.   Hyperthroidism placed under PTU by Primary OB 


10.   Left Ovarian cyst 


  








RECOMMENDATIONS:


1.   Continue in patient management 


2.   Continue zofran PRN.


3.   No indication for surgery at this time.  


4.   Will follow-up in the office.  


5.   Labs are trending down, except for lipase.  


6.   Patient is stable.  Pain is not typical of simply cholelithiasis.  


7.   She may have a mild component of cholecystitis, however she has not tried 

any pain medicine, her WBC is normal, and she has no fevers. 


8.   In agreement with current plan of care 


9.   At 13 weeks , discharge consideration as per OB and surgery

## 2018-01-08 NOTE — PROGRESS NOTE
Assessment and Plan





- Patient Problems


(1) Cholelithiasis affecting pregnancy in first trimester, antepartum


Onset Date: 01/06/18   Current Visit: Yes   Status: Acute   


Plan to address problem: 


Will continue with current plan.  No indication for surgery at this time.  Will 

follow-up in the office.  Labs are trending down, except for lipase.  Patient 

is stable.  Pain is not typical of simply cholelithiasis.  She may have a mild 

component of cholecystitis, however she has not tried any pain medicine, her 

WBC is normal, and she has no fevers.  I wonder if there is some muscular 

strain component as she has pain to palpation in her back and shoulder which is 

unusual for cholecystitis.  In addition, on each of my visits, she does not 

appear to be in any pain.  Except for the lipase, everything else has come 

down.  The mild elevation of lipase could be related to her hyperemesis 

syndrome.  Finally, I noticed that Cleocin was started yesterday.  I would not 

use this for the gallbladder.  If there is another indication, that's fine.  

Would recommend liquid diet at this point with nutritional shakes.  Would not 

push regular food at this time.  Please note the entire interview and exam was 

done with the phone  assisting.








(2) Dehydration during pregnancy


Onset Date: 01/06/18   Current Visit: Yes   Status: Acute   


Plan to address problem: 


Clinically appears stable today.  Continue current plan.  Would recommend 

liquids and nutritional shakes only at this time.  Would not push regular diet..








(3) Elevated LFTs


Onset Date: 01/06/18   Current Visit: Yes   Status: Acute   


Plan to address problem: 


Resolving as expected.








(4) Hypokalemia


Onset Date: 01/06/18   Current Visit: Yes   Status: Acute   


Plan to address problem: 


Agree with replacement plan per primary team.








Subjective


Date of service: 01/08/18


Patient Reports: Positive: no new complaints, still having pain (only in the 

right upper quadrant, back, and shoulder just like on admission.  She has not 

asked for any pain medicine.  She reports her pain is an 8 out of 10.), 

tolerating liquids well (having some difficulty with regular diet.  Nurse 

reports that she tolerated her last regular meal well.), vomiting (reports 

vomiting on average twice a day.  Vomits about half a cup.  Vomiting is now 

similar to the way it was during her pregnancy.)





Objective


 Vital Signs - 12hr











  01/07/18 01/08/18 01/08/18





  23:55 07:30 08:31


 


Temperature 97.8 F  97.6 F


 


Pulse Rate 85  96 H


 


Respiratory 20 20 20





Rate   


 


Blood Pressure 90/48  


 


Blood Pressure   99/51





[Right]   


 


O2 Sat by Pulse 99  95





Oximetry   














- General physical appearance


well developed, well nourished, no distress (does not appear in any pain)





- Eyes


normal occular movement, other (anicteric)





- Respiratory


normal expansion, normal respiratory effort





- Abdomen


soft, tender (in right upper quadrant just under costal margin), bowel sounds 

normal, not distended, not rebound, not guarding, not rigid





- Musculoskeletal


other (tender palpation and right flank and right shoulder.)





- Labs





 01/04/18 10:46





 01/07/18 12:54


 Diabetes panel











  01/07/18 Range/Units





  12:54 


 


Sodium  137  (137-145)  mmol/L


 


Potassium  2.9 L*  (3.6-5.0)  mmol/L


 


Chloride  103.9  ()  mmol/L


 


Carbon Dioxide  20 L  (22-30)  mmol/L


 


BUN  2 L  (7-17)  mg/dL


 


Creatinine  0.3 L  (0.7-1.2)  mg/dL


 


Glucose  128 H  ()  mg/dL


 


Calcium  8.1 L  (8.4-10.2)  mg/dL


 


AST  68 H  (5-40)  units/L


 


ALT  79 H  (7-56)  units/L


 


Alkaline Phosphatase  124  ()  units/L


 


Total Protein  5.9 L  (6.3-8.2)  g/dL


 


Albumin  3.1 L  (3.9-5)  g/dL








 Calcium panel











  01/07/18 Range/Units





  12:54 


 


Calcium  8.1 L  (8.4-10.2)  mg/dL


 


Albumin  3.1 L  (3.9-5)  g/dL








 Pituitary panel











  01/07/18 Range/Units





  12:54 


 


Sodium  137  (137-145)  mmol/L


 


Potassium  2.9 L*  (3.6-5.0)  mmol/L


 


Chloride  103.9  ()  mmol/L


 


Carbon Dioxide  20 L  (22-30)  mmol/L


 


BUN  2 L  (7-17)  mg/dL


 


Creatinine  0.3 L  (0.7-1.2)  mg/dL


 


Glucose  128 H  ()  mg/dL


 


Calcium  8.1 L  (8.4-10.2)  mg/dL








 Adrenal panel











  01/07/18 Range/Units





  12:54 


 


Sodium  137  (137-145)  mmol/L


 


Potassium  2.9 L*  (3.6-5.0)  mmol/L


 


Chloride  103.9  ()  mmol/L


 


Carbon Dioxide  20 L  (22-30)  mmol/L


 


BUN  2 L  (7-17)  mg/dL


 


Creatinine  0.3 L  (0.7-1.2)  mg/dL


 


Glucose  128 H  ()  mg/dL


 


Calcium  8.1 L  (8.4-10.2)  mg/dL


 


Total Bilirubin  0.70  (0.1-1.2)  mg/dL


 


AST  68 H  (5-40)  units/L


 


ALT  79 H  (7-56)  units/L


 


Alkaline Phosphatase  124  ()  units/L


 


Total Protein  5.9 L  (6.3-8.2)  g/dL


 


Albumin  3.1 L  (3.9-5)  g/dL

## 2018-01-08 NOTE — PROGRESS NOTE
Assessment and Plan





- Patient Problems


(1) 13 weeks gestation of pregnancy


Onset Date: 18   Current Visit: Yes   Status: Acute   





(2) Cholelithiasis affecting pregnancy in first trimester, antepartum


Onset Date: 18   Current Visit: Yes   Status: Acute   





(3) Dehydration during pregnancy


Onset Date: 18   Current Visit: Yes   Status: Acute   





(4) Elevated LFTs


Onset Date: 18   Current Visit: Yes   Status: Acute   





(5) Hyperemesis gravidarum


Onset Date: 18   Current Visit: Yes   Status: Acute   





(6) Hyperthyroidism affecting pregnancy


Onset Date: 18   Current Visit: Yes   Status: Acute   


Qualifiers: 


   Trimester: first trimester   Qualified Code(s): O99.281 - Endocrine, 

nutritional and metabolic diseases complicating pregnancy, first trimester; 

E05.90 - Thyrotoxicosis, unspecified without thyrotoxic crisis or storm; E05.90 

- Thyrotoxicosis, unspecified without thyrotoxic crisis or storm; E05.90 - 

Thyrotoxicosis, unspecified without thyrotoxic crisis or storm   





(7) Hypokalemia


Onset Date: 18   Current Visit: Yes   Status: Acute   





(8) UTI (urinary tract infection) during pregnancy


Onset Date: 18   Current Visit: Yes   Status: Acute   


Qualifiers: 


   Trimester: first trimester   Qualified Code(s): O23.41 - Unspecified 

infection of urinary tract in pregnancy, first trimester   





Subjective





- Subjective


Date of service: 18


Principal diagnosis: IUP @ 13 weeks, vomiting, hyperthyroidism, UTI


Interval history: 





Patient is a 40 year old , LMP 10/11/17 who is at 13+ weeks gestation 

who was admitted for vomiting, inability to tolerated PO intake, 12-lbs weight 

loss, RUQ pain which started 5 days earlier. She denied any fever, pelvic pain, 

or vaginal bleeding. She had just started PNC at [facility]. She 

went  for her routine visit on 17 with the above complaints, and was sent 

to the ER for evaluation. In the ER, she was found to be very dehydrated, 

vomiting, elevated liver enzymes and low potassium. She was given IV fluid, K+ 

via K-rider, zofran. Abdominal sonogram showed: + gallstone with gallbladder 

sludge. Surgical consult was done and recommended no intervention at present as 

patient is afebrile


OB sono showed a viable fetus at 12 weeks and 6 days.


On exam: + TN in her RUQ and right upper back, no pelvic TN.


Today, her pain has decreased in severity and she feels hungry. Her LFT's have 

decreased.


She has been tachycardic and was found to have hyperthyroidism. 


PTU has been given.


MFM consult done and agreed with current management.


This morning she is nauseated but no vomiting. Labs yesterday showed an 

increase in LFT's and Lipase. Urine culture showed >100K GNR.  Clindamycin has 

been started - pending sensitivities.


Patient reports: no new complaints, no vaginal bleeding





Objective





- Vital Signs


Vital Signs: 


 Vital Signs - 12hr











  18





  23:55 07:30 08:31


 


Temperature 97.8 F  97.6 F


 


Pulse Rate 85  96 H


 


Respiratory 20 20 20





Rate   


 


Blood Pressure 90/48  


 


Blood Pressure   99/51





[Right]   


 


O2 Sat by Pulse 99  95





Oximetry   














- Exam


Cardiovascular: Regular rate


Lungs: Clear to auscultation


Abdomen: Present: normal appearance, soft





- Labs


Labs: 


 Abnormal Labs











  18





  15:45 15:45 16:05


 


Hgb  15.0 H  


 


MCHC  36 H  


 


Mono % (Auto)  11.1 H  


 


Mono #  0.9 H  


 


Seg Neutrophils %   


 


Seg Neutrophils #   


 


Sodium   134 L 


 


Potassium   2.6 L* 


 


Chloride   91.0 L 


 


Carbon Dioxide   


 


BUN   


 


Creatinine   0.4 L 


 


Glucose   114 H 


 


Calcium   


 


Magnesium   


 


Total Bilirubin   1.50 H 


 


Direct Bilirubin   


 


AST   110 H 


 


ALT   118 H 


 


Total Protein   


 


Albumin   


 


Amylase   


 


Lipase   


 


TSH   


 


Free T4   


 


Urine WBC (Auto)    15.0 H


 


Urine HCG, Qual   














  18





  23:59 01:54 01:54


 


Hgb   


 


MCHC   


 


Mono % (Auto)   


 


Mono #   


 


Seg Neutrophils %   


 


Seg Neutrophils #   


 


Sodium    135 L


 


Potassium    2.4 L*


 


Chloride   


 


Carbon Dioxide    19 L


 


BUN    6 L


 


Creatinine    0.3 L


 


Glucose   


 


Calcium    7.9 L D


 


Magnesium   


 


Total Bilirubin   


 


Direct Bilirubin   


 


AST   


 


ALT   


 


Total Protein   


 


Albumin   


 


Amylase   


 


Lipase   161 H 


 


TSH   


 


Free T4   


 


Urine WBC (Auto)   


 


Urine HCG, Qual  Positive A  














  18





  09:31 09:51 09:51


 


Hgb   


 


MCHC   


 


Mono % (Auto)   


 


Mono #   


 


Seg Neutrophils %   


 


Seg Neutrophils #   


 


Sodium   


 


Potassium   


 


Chloride   


 


Carbon Dioxide   


 


BUN   


 


Creatinine   


 


Glucose   


 


Calcium   


 


Magnesium   


 


Total Bilirubin   


 


Direct Bilirubin   


 


AST   


 


ALT   


 


Total Protein   


 


Albumin   


 


Amylase  135 H  


 


Lipase  168 H  


 


TSH    0.005 L


 


Free T4   6.38 H 


 


Urine WBC (Auto)   


 


Urine HCG, Qual   














  18





  10:46 10:46 06:00


 


Hgb   


 


MCHC  35 H  


 


Mono % (Auto)   


 


Mono #   


 


Seg Neutrophils %  74.1 H  


 


Seg Neutrophils #  7.9 H  


 


Sodium   135 L 


 


Potassium   2.4 L*  2.8 L*


 


Chloride   


 


Carbon Dioxide   17 L 


 


BUN   5 L 


 


Creatinine   0.3 L 


 


Glucose   


 


Calcium   8.0 L 


 


Magnesium   


 


Total Bilirubin   


 


Direct Bilirubin    0.8 H


 


AST   75 H  66 H


 


ALT   92 H  78 H


 


Total Protein   5.8 L D  5.6 L


 


Albumin   3.2 L  3.0 L


 


Amylase   


 


Lipase    148 H


 


TSH   


 


Free T4   


 


Urine WBC (Auto)   


 


Urine HCG, Qual   














  18





  11:02 05:19 10:11


 


Hgb   


 


MCHC   


 


Mono % (Auto)   


 


Mono #   


 


Seg Neutrophils %   


 


Seg Neutrophils #   


 


Sodium   


 


Potassium  2.4 L*  2.7 L* 


 


Chloride   


 


Carbon Dioxide  20 L  


 


BUN  3 L  


 


Creatinine  0.3 L  


 


Glucose   


 


Calcium  7.8 L  


 


Magnesium    1.60 L


 


Total Bilirubin   


 


Direct Bilirubin   


 


AST  82 H  


 


ALT  87 H  


 


Total Protein  5.8 L  


 


Albumin  3.0 L  


 


Amylase   


 


Lipase  187 H  


 


TSH   


 


Free T4   


 


Urine WBC (Auto)   


 


Urine HCG, Qual   














  18





  12:54


 


Hgb 


 


MCHC 


 


Mono % (Auto) 


 


Mono # 


 


Seg Neutrophils % 


 


Seg Neutrophils # 


 


Sodium 


 


Potassium  2.9 L*


 


Chloride 


 


Carbon Dioxide  20 L


 


BUN  2 L


 


Creatinine  0.3 L


 


Glucose  128 H


 


Calcium  8.1 L


 


Magnesium 


 


Total Bilirubin 


 


Direct Bilirubin 


 


AST  68 H


 


ALT  79 H


 


Total Protein  5.9 L


 


Albumin  3.1 L


 


Amylase 


 


Lipase  220 H


 


TSH 


 


Free T4 


 


Urine WBC (Auto) 


 


Urine HCG, Qual 








 Laboratory Results - last 24 hr











  18





  12:54


 


Sodium  137


 


Potassium  2.9 L*


 


Chloride  103.9


 


Carbon Dioxide  20 L


 


Anion Gap  16


 


BUN  2 L


 


Creatinine  0.3 L


 


Estimated GFR  > 60


 


BUN/Creatinine Ratio  7


 


Glucose  128 H


 


Calcium  8.1 L


 


Total Bilirubin  0.70


 


AST  68 H


 


ALT  79 H


 


Alkaline Phosphatase  124


 


Total Protein  5.9 L


 


Albumin  3.1 L


 


Albumin/Globulin Ratio  1.1


 


Lipase  220 H








 Microbiology











 18 Unknown Urine Culture - Preliminary





 Urine,Clean Catch    Gram Negative Montez


 


 18 13:45 Urine Culture - Final





 Urine,Clean Catch

## 2018-01-09 NOTE — DISCHARGE SUMMARY
Providers





- Providers


Date of Admission: 


18 01:49





Date of discharge: 18


Attending physician: 


BRANDEN CERON MD





 





18 10:47


Consult to Physician [CONS] Stat 


   Consulting Provider: BORIS KATHLEEN


   Reason For Exam: gallstone


   Place consult to:: dr kathleen


   Notified:: yes


   Was contact made?: Yes


   Comment:: completed











Primary care physician: 


PRIMARY CARE MD








Hospitalization


Reason for admission: observation (IUP at 13 weeks with N/V and UTI), other


Discharge diagnosis: other (IUP at 13 weeks with Hyperemesis and UTI)


Hospital course: 


Patient is a 40 year old , LMP 10/11/17 who is at 13+ weeks gestation 

who was admitted for vomiting, inability to tolerated PO intake, 12-lbs weight 

loss, RUQ pain which started 5 days earlier. She denied any fever, pelvic pain, 

or vaginal bleeding. She had just started PNC at [facility]. She 

went  for her routine visit on 17 with the above complaints, and was sent 

to the ER for evaluation. In the ER, she was found to be very dehydrated, 

vomiting, elevated liver enzymes and low potassium. She was given IV fluid, K+ 

via K-rider, zofran. Abdominal sonogram showed: + gallstone with gallbladder 

sludge. Surgical consult was done and recommended no intervention at present as 

patient is afebrile


OB sono showed a viable fetus at 12 weeks and 6 days.


On exam: + TN in her RUQ and right upper back, no pelvic TN.


Today, her pain has decreased in severity and she feels hungry. Her LFT's have 

decreased.


She has been tachycardic and was found to have hyperthyroidism. 


PTU has been given.


MFM consult done and agreed with current management.


This morning she is nauseated but no vomiting. 


Urine culture positive for 100,000 gram-negative rods found to be Escherichia 

coli sensitive to Macrobid


Condition at discharge: Stable


Disposition: - TO HOME OR SELFCARE





- Discharge Diagnoses


(1) 13 weeks gestation of pregnancy


Status: Acute   





(2) UTI (urinary tract infection) during pregnancy


Status: Acute   


Qualifiers: 


   Trimester: first trimester   Qualified Code(s): O23.41 - Unspecified 

infection of urinary tract in pregnancy, first trimester   





(3) Hyperemesis gravidarum


Status: Acute   





Plan





- Discharge Medications


Prescriptions: 


Nitrofurantoin Monohyd/M-Cryst [Macrobid 100 mg Capsule] 100 mg PO BID #30 

capsule


Ondansetron [Zofran Odt] 4 mg PO Q6HR #60 tab.rapdis





- Provider Discharge Summary


Activity: no sex for 6 weeks, no heavy lifting 4 weeks, no strenuous exercise


Diet: other (as per nutrition)


Additional instructions: 


[]  Smoking cessation referral if applicable(refer to patient education folder 

for contact #)


[]  Refer to [facility]'s [facility] Booklet








Call your doctor immediately for:


* Fever > 100.5


* Heavy vaginal bleeding ( >1 pad per hour)


* Severe persistent headache


* Shortness of breath


* Reddened, hot, painful area to leg or breast


* Drainage or odor from incision.





* Keep incision clean and dry at all times and follow doctor's instructions 

regarding bathing/showering











- Follow up plan


Follow up: 


TERESA ZEPEDA MD [Referring] - 3-5 Days


BRANDEN CERON MD [Staff Physician] - 7 Days

## 2018-01-09 NOTE — PROGRESS NOTE
Assessment and Plan


A:


40-year-old with hyperemesis gravidarum and UTI


-Much improved





P:


-Will discharge home


-Follow-up in clinic next week


-Complete antibiotic course as prescribed








- Patient Problems


(1) 13 weeks gestation of pregnancy


Onset Date: 01/06/18   Current Visit: Yes   Status: Acute   





(2) UTI (urinary tract infection) during pregnancy


Onset Date: 01/08/18   Current Visit: Yes   Status: Acute   


Qualifiers: 


   Trimester: first trimester   Qualified Code(s): O23.41 - Unspecified 

infection of urinary tract in pregnancy, first trimester   





(3) Hyperemesis gravidarum


Onset Date: 01/06/18   Current Visit: Yes   Status: Acute   





Subjective





- Subjective


Date of service: 01/09/18


Principal diagnosis: IUP @ ~ 13 weeks, vomiting, hyperthyroidism, UTI


Interval history: 


Patient seen and examined, stable.  Still has nausea but no vomiting, she is 

eating.  No fever or chills no abdominal pain


Urine culture that over 100,000 Escherichia coli sensitive to Bactrim and 

Macrobid





Patient reports: no new complaints, no loss of fluid, no vaginal bleeding, no 

contractions





Objective





- Vital Signs


Vital Signs: 


 Vital Signs - 12hr











  01/09/18 01/09/18 01/09/18





  00:50 05:05 06:35


 


Temperature 98.3 F 98.5 F 


 


Pulse Rate 91 H 92 H 88


 


Respiratory 18 18 16





Rate   


 


Blood Pressure 87/43 82/42 


 


Blood Pressure   102/54





[Right]   


 


O2 Sat by Pulse 99 99 





Oximetry   














  01/09/18





  07:58


 


Temperature 98.3 F


 


Pulse Rate 83


 


Respiratory 16





Rate 


 


Blood Pressure 


 


Blood Pressure 97/60





[Right] 


 


O2 Sat by Pulse 97





Oximetry 














- Exam


Abdomen: Present: normal appearance, soft.  Absent: distention, tenderness, 

guarding, rigidity





- Labs


Labs: 


 Abnormal Labs











  01/03/18 01/03/18 01/03/18





  15:45 15:45 16:05


 


Hgb  15.0 H  


 


MCHC  36 H  


 


Mono % (Auto)  11.1 H  


 


Mono #  0.9 H  


 


Seg Neutrophils %   


 


Seg Neutrophils #   


 


Sodium   134 L 


 


Potassium   2.6 L* 


 


Chloride   91.0 L 


 


Carbon Dioxide   


 


BUN   


 


Creatinine   0.4 L 


 


Glucose   114 H 


 


Calcium   


 


Magnesium   


 


Total Bilirubin   1.50 H 


 


Direct Bilirubin   


 


AST   110 H 


 


ALT   118 H 


 


Total Protein   


 


Albumin   


 


Amylase   


 


Lipase   


 


TSH   


 


Free T4   


 


Urine WBC (Auto)    15.0 H


 


Urine HCG, Qual   














  01/03/18 01/04/18 01/04/18





  23:59 01:54 01:54


 


Hgb   


 


MCHC   


 


Mono % (Auto)   


 


Mono #   


 


Seg Neutrophils %   


 


Seg Neutrophils #   


 


Sodium    135 L


 


Potassium    2.4 L*


 


Chloride   


 


Carbon Dioxide    19 L


 


BUN    6 L


 


Creatinine    0.3 L


 


Glucose   


 


Calcium    7.9 L D


 


Magnesium   


 


Total Bilirubin   


 


Direct Bilirubin   


 


AST   


 


ALT   


 


Total Protein   


 


Albumin   


 


Amylase   


 


Lipase   161 H 


 


TSH   


 


Free T4   


 


Urine WBC (Auto)   


 


Urine HCG, Qual  Positive A  














  01/04/18 01/04/18 01/04/18





  09:31 09:51 09:51


 


Hgb   


 


MCHC   


 


Mono % (Auto)   


 


Mono #   


 


Seg Neutrophils %   


 


Seg Neutrophils #   


 


Sodium   


 


Potassium   


 


Chloride   


 


Carbon Dioxide   


 


BUN   


 


Creatinine   


 


Glucose   


 


Calcium   


 


Magnesium   


 


Total Bilirubin   


 


Direct Bilirubin   


 


AST   


 


ALT   


 


Total Protein   


 


Albumin   


 


Amylase  135 H  


 


Lipase  168 H  


 


TSH    0.005 L


 


Free T4   6.38 H 


 


Urine WBC (Auto)   


 


Urine HCG, Qual   














  01/04/18 01/04/18 01/05/18





  10:46 10:46 06:00


 


Hgb   


 


MCHC  35 H  


 


Mono % (Auto)   


 


Mono #   


 


Seg Neutrophils %  74.1 H  


 


Seg Neutrophils #  7.9 H  


 


Sodium   135 L 


 


Potassium   2.4 L*  2.8 L*


 


Chloride   


 


Carbon Dioxide   17 L 


 


BUN   5 L 


 


Creatinine   0.3 L 


 


Glucose   


 


Calcium   8.0 L 


 


Magnesium   


 


Total Bilirubin   


 


Direct Bilirubin    0.8 H


 


AST   75 H  66 H


 


ALT   92 H  78 H


 


Total Protein   5.8 L D  5.6 L


 


Albumin   3.2 L  3.0 L


 


Amylase   


 


Lipase    148 H


 


TSH   


 


Free T4   


 


Urine WBC (Auto)   


 


Urine HCG, Qual   














  01/06/18 01/07/18 01/07/18





  11:02 05:19 10:11


 


Hgb   


 


MCHC   


 


Mono % (Auto)   


 


Mono #   


 


Seg Neutrophils %   


 


Seg Neutrophils #   


 


Sodium   


 


Potassium  2.4 L*  2.7 L* 


 


Chloride   


 


Carbon Dioxide  20 L  


 


BUN  3 L  


 


Creatinine  0.3 L  


 


Glucose   


 


Calcium  7.8 L  


 


Magnesium    1.60 L


 


Total Bilirubin   


 


Direct Bilirubin   


 


AST  82 H  


 


ALT  87 H  


 


Total Protein  5.8 L  


 


Albumin  3.0 L  


 


Amylase   


 


Lipase  187 H  


 


TSH   


 


Free T4   


 


Urine WBC (Auto)   


 


Urine HCG, Qual   














  01/07/18 01/08/18 01/08/18





  12:54 14:30 14:30


 


Hgb   


 


MCHC   


 


Mono % (Auto)   


 


Mono #   


 


Seg Neutrophils %   


 


Seg Neutrophils #   


 


Sodium   


 


Potassium  2.9 L*  3.2 L 


 


Chloride   


 


Carbon Dioxide  20 L  20 L 


 


BUN  2 L  3 L 


 


Creatinine  0.3 L  0.3 L 


 


Glucose  128 H  130 H 


 


Calcium  8.1 L  8.0 L 


 


Magnesium   


 


Total Bilirubin   


 


Direct Bilirubin   


 


AST  68 H  58 H 


 


ALT  79 H  77 H 


 


Total Protein  5.9 L  5.2 L 


 


Albumin  3.1 L  3.0 L 


 


Amylase   


 


Lipase  220 H   214 H


 


TSH   


 


Free T4   


 


Urine WBC (Auto)   


 


Urine HCG, Qual   








 Laboratory Results - last 24 hr











  01/08/18 01/08/18





  14:30 14:30


 


Sodium  137 


 


Potassium  3.2 L 


 


Chloride  104.3 


 


Carbon Dioxide  20 L 


 


Anion Gap  16 


 


BUN  3 L 


 


Creatinine  0.3 L 


 


Estimated GFR  > 60 


 


BUN/Creatinine Ratio  10 


 


Glucose  130 H 


 


Calcium  8.0 L 


 


Total Bilirubin  0.40 


 


AST  58 H 


 


ALT  77 H 


 


Alkaline Phosphatase  118 


 


Total Protein  5.2 L 


 


Albumin  3.0 L 


 


Albumin/Globulin Ratio  1.4 


 


Lipase   214 H

## 2018-01-10 NOTE — EMERGENCY DEPARTMENT REPORT
ED Abdominal Pain HPI





- General


Chief Complaint: Abdominal Pain


Stated Complaint: ABDOMINAL PAIN/ GALLBLADDER


Time Seen by Provider: 01/10/18 17:12


Source: patient, family


Mode of arrival: Ambulatory


Limitations: Language Barrier





- History of Present Illness


Initial Comments: 





39 yo female who comes in today due to recurrent cholelithiasis.  She is 

currently 13 weeks pregnant, and was recently discharged from the hospital on 

yesterday.  She was diagnosed with gallstones and is scheduled for surgery in 

two weeks per the patient.  She states that she was discharged on yesterday 

evening and the pain returned about 45 minutes after being discharged home.  

Pain is described as ruq, 10/10, with radiation to her back.  She also admits 

to nausea/vomiting.  


MD Complaint: abdominal pain, other


-: days(s) (one )


Location: RUQ


Radiation: back


Migration to: other (back )


Severity: moderate


Severity scale (0 -10): 10


Quality: cramping, sharp


Consistency: constant


Improves With: nothing


Worsens With: eating


Context: other (cholelithiasis )


Associated Symptoms: nausea, vomiting





- Related Data


LMP (females 10-50): other (4 months ago)


 Home Medications











 Medication  Instructions  Recorded  Confirmed  Last Taken


 


Nitrofurantoin Mono/M-Cryst 1 tab PO Q12HR 09/28/16 01/05/18 Unknown





[Macrobid CAP]    


 


Ondansetron [Zofran ODT TAB] 1 tab PO Q8HR 09/28/16 01/05/18 Unknown








 Previous Rx's











 Medication  Instructions  Recorded  Last Taken  Type


 


Ferrous Sulfate [Feosol 325 MG tab] 325 mg PO BID #60 tablet 09/28/16 Unknown Rx


 


Ibuprofen [Motrin 800 MG tab] 800 mg PO Q8HR PRN #30 tablet 09/28/16 Unknown Rx


 


oxyCODONE /ACETAMINOPHEN [Percocet 1 tab PO Q6HR PRN #30 tablet 09/28/16 

Unknown Rx





5/325]    


 


Cefuroxime [Ceftin] 250 mg PO Q12H #10 tablet 12/19/17 Unknown Rx


 


Famotidine [Pepcid] 20 mg PO QDAY #20 tablet 12/19/17 Unknown Rx


 


Ondansetron [Zofran Odt] 4 mg PO Q6H PRN #7 tab.rapdis 12/19/17 Unknown Rx


 


Nitrofurantoin Monohyd/M-Cryst 100 mg PO BID #30 capsule 01/09/18 Unknown Rx





[Macrobid 100 mg Capsule]    


 


Ondansetron [Zofran Odt] 4 mg PO Q6HR #60 tab.rapdis 01/09/18 Unknown Rx


 


Potassium Chloride 20 meq PO QDAY #7 packet 01/09/18 Unknown Rx


 


Potassium Chloride 20 meq PO QDAY #7 packet 01/09/18 Unknown Rx











 Allergies











Allergy/AdvReac Type Severity Reaction Status Date / Time


 


Penicillins AdvReac  Rash Verified 01/04/18 13:31














ED Review of Systems


ROS: 


Stated complaint: ABDOMINAL PAIN/ GALLBLADDER


Other details as noted in HPI





Constitutional: denies: chills, fever


Eyes: denies: eye pain, eye discharge, vision change


ENT: denies: ear pain, throat pain


Respiratory: denies: cough, shortness of breath, wheezing


Cardiovascular: denies: chest pain, palpitations


Endocrine: no symptoms reported


Gastrointestinal: as per HPI, abdominal pain, nausea, vomiting


Genitourinary: denies: urgency, dysuria, discharge


Musculoskeletal: denies: back pain, joint swelling, arthralgia


Skin: denies: rash, lesions


Neurological: denies: headache, weakness, paresthesias


Psychiatric: denies: anxiety, depression


Hematological/Lymphatic: denies: easy bleeding, easy bruising





ED Past Medical Hx





- Past Medical History


Previous Medical History?: Yes


Hx Hypertension: No


Hx Congestive Heart Failure: No


Hx Diabetes: No


Hx Deep Vein Thrombosis: No


Hx Renal Disease: No


Hx Sickle Cell Disease: No


Hx Seizures: No


Hx Asthma: No


Hx COPD: No


Hx HIV: No


Additional medical history: Vaginal delivery x 2 , Gallstones, Gallbladder 

sludge, Miscarriage





- Surgical History


Past Surgical History?: No





- Social History


Smoking Status: Never Smoker


Substance Use Type: Alcohol, Prescribed





- Medications


Home Medications: 


 Home Medications











 Medication  Instructions  Recorded  Confirmed  Last Taken  Type


 


Ferrous Sulfate [Feosol 325 MG tab] 325 mg PO BID #60 tablet 09/28/16 01/05/18 

Unknown Rx


 


Ibuprofen [Motrin 800 MG tab] 800 mg PO Q8HR PRN #30 tablet 09/28/16 01/05/18 

Unknown Rx


 


Nitrofurantoin Mono/M-Cryst 1 tab PO Q12HR 09/28/16 01/05/18 Unknown History





[Macrobid CAP]     


 


Ondansetron [Zofran ODT TAB] 1 tab PO Q8HR 09/28/16 01/05/18 Unknown History


 


oxyCODONE /ACETAMINOPHEN [Percocet 1 tab PO Q6HR PRN #30 tablet 09/28/16 01/05/ 18 Unknown Rx





5/325]     


 


Cefuroxime [Ceftin] 250 mg PO Q12H #10 tablet 12/19/17 01/05/18 Unknown Rx


 


Famotidine [Pepcid] 20 mg PO QDAY #20 tablet 12/19/17 01/05/18 Unknown Rx


 


Ondansetron [Zofran Odt] 4 mg PO Q6H PRN #7 tab.rapdis 12/19/17 01/05/18 

Unknown Rx


 


Nitrofurantoin Monohyd/M-Cryst 100 mg PO BID #30 capsule 01/09/18  Unknown Rx





[Macrobid 100 mg Capsule]     


 


Ondansetron [Zofran Odt] 4 mg PO Q6HR #60 tab.rapdis 01/09/18  Unknown Rx


 


Potassium Chloride 20 meq PO QDAY #7 packet 01/09/18  Unknown Rx


 


Potassium Chloride 20 meq PO QDAY #7 packet 01/09/18  Unknown Rx














ED Physical Exam





- General


Limitations: Language Barrier


General appearance: alert, in no apparent distress





- Head


Head exam: Present: atraumatic, normocephalic





- Eye


Eye exam: Present: normal appearance





- ENT


ENT exam: Present: mucous membranes moist





- Neck


Neck exam: Present: normal inspection





- Respiratory


Respiratory exam: Present: normal lung sounds bilaterally.  Absent: respiratory 

distress





- Cardiovascular


Cardiovascular Exam: Present: tachycardia





- GI/Abdominal


GI/Abdominal exam: Present: tenderness (RUQ)





- Extremities Exam


Extremities exam: Present: normal inspection





- Back Exam


Back exam: Present: normal inspection





- Neurological Exam


Neurological exam: Present: alert, oriented X3





- Psychiatric


Psychiatric exam: Present: normal affect, normal mood





- Skin


Skin exam: Present: warm, dry, intact, normal color.  Absent: rash





ED Course


 Vital Signs











  01/10/18 01/10/18 01/10/18





  14:56 15:21 18:46


 


Temperature 98.2 F  


 


Pulse Rate 128 H  83


 


Respiratory 18 18 16





Rate   


 


Blood Pressure 115/72  


 


Blood Pressure   





[Left]   


 


O2 Sat by Pulse 98  





Oximetry   














  01/10/18 01/10/18 01/10/18





  18:50 19:00 19:03


 


Temperature   


 


Pulse Rate 88 83 


 


Respiratory 21 19 18





Rate   


 


Blood Pressure  99/51 


 


Blood Pressure 99/51  





[Left]   


 


O2 Sat by Pulse 99  98





Oximetry   














  01/10/18 01/10/18 01/10/18





  19:15 19:29 19:30


 


Temperature 98 F  


 


Pulse Rate 85  86


 


Respiratory 20 17 16





Rate   


 


Blood Pressure 99/48  103/60


 


Blood Pressure 103/60  





[Left]   


 


O2 Sat by Pulse 99  





Oximetry   














  01/10/18 01/10/18 01/10/18





  19:45 20:00 20:15


 


Temperature   


 


Pulse Rate 91 H 81 83


 


Respiratory 11 L 12 13





Rate   


 


Blood Pressure 103/60 104/60 103/60


 


Blood Pressure   





[Left]   


 


O2 Sat by Pulse 100  100





Oximetry   














- Reevaluation(s)


Reevaluation #1: 





01/10/18 18:26


Cholelithiasis.  She is scheduled for surgery in two weeks per the patient.  

Will speak with the hospitalist about admitting for pain control. 


Reevaluation #2: 





01/10/18 20:48


Dr. Flor-OB to speak with Surgery about admitting the patient.  Awaiting call 

back.  


01/10/18 21:00


Dr. Flor to admit to the patient.  Appreciate her assistance. 





ED Medical Decision Making





- Lab Data


Result diagrams: 


 01/10/18 15:30





 01/10/18 15:30





- Medical Decision Making





Pregnancy


Cholelithiasis


Hyperemesis gravidarum


Intractable nausea/vomiting 





- Differential Diagnosis


pregnancy, hyperemesis gravidarum, cholelithiasis, nausea/vomiting


Critical care attestation.: 


If time is entered above; I have spent that time in minutes in the direct care 

of this critically ill patient, excluding procedure time.








ED Disposition


Clinical Impression: 


 Hyperemesis gravidarum, 13 weeks gestation of pregnancy, UTI (urinary tract 

infection), Cholelithiasis affecting pregnancy in second trimester, antepartum





Disposition: DC-09 OP ADMIT IP TO THIS HOSP


Is pt being admited?: Yes


Does the pt Need Aspirin: No


Condition: Stable


Instructions:  Abdominal Pain (ED)


Referrals: 


CYNDIE CHENEY MD [Primary Care Provider] - 3-5 Days


Time of Disposition: 20:50

## 2018-01-10 NOTE — EVENT NOTE
Date: 01/10/18


I was called to admit this "un-referred" 39 yo  at 13+ weeks EGA (second 

trimester) who has a confusing picture with some components of hyperemesis and 

some of gallbladder dysfunction plus apparent hyperthyroidism and recent UTI.  

She was discharged from the hospital about 36 hours ago with the same 

complaints of nausea, vomiting, elevated LFTs, lipase, very low TSH and 

elevated T4.  Apparently she was placed on PTU in the hospital. 





According to ER Dr Gee:


"39 yo female who comes in today due to recurrent cholelithiasis.  She is 

currently 13 weeks pregnant, and was recently discharged from the hospital on 

yesterday.  She was diagnosed with gallstones and is scheduled for surgery in 

two weeks per the patient.  She states that she was discharged on yesterday 

evening and the pain returned about 45 minutes after being discharged home.  

Pain is described as RUQ, 10/10, with radiation to her back.  She also admits 

to nausea/vomiting."





Spoke to Dr Tha mcclain and he agreed to assume care of the patient that was 

just discharged by his [facility].

## 2018-01-11 NOTE — CONSULTATION
History of Present Illness


Consult date: 01/11/18


Reason for consult: gallstones


Requesting physician: BRIANDA FLOR


Chief complaint: 





abdominal pain with nausea and vomiting





- History of present illness


History of present illness: 





40F who is 13weeks pregnant is well known to me as she was recently discharged 

from the hospital. She returned to the ED last night with complaints of 

abdominal pain, nausea, vomiting, and inability to tolerate UTI abx (macrobid). 

I discussed the case with Dr. Flor last night. the original plan was to try 

and get her further into the second trimester before considering any surgery. 

We discussed a plan of pain meds and phenergan for nausea. Pt reports that her 

pain and nausea are much better now. There are now new issues. She was not sent 

home with pain meds and the zofran did not work for her. as mentioned, the 

macrobid made her very nauseated. no F/C. Pain is unchanged. 





Past History


Past Medical History: No medical history


Past Surgical History: No surgical history


Social history: .  denies: smoking, alcohol abuse


Family history: no significant family history





Medications and Allergies


 Allergies











Allergy/AdvReac Type Severity Reaction Status Date / Time


 


Penicillins AdvReac  Rash Verified 01/04/18 13:31











 Home Medications











 Medication  Instructions  Recorded  Confirmed  Last Taken  Type


 


Ondansetron [Zofran Odt] 4 mg PO Q6H PRN #7 tab.rapdis 12/19/17 01/10/18 01/10/

18 Rx


 


Nitrofurantoin Monohyd/M-Cryst 100 mg PO BID #30 capsule 01/09/18 01/10/18 01/10

/18 Rx





[Macrobid 100 mg Capsule]     


 


Potassium Chloride 20 meq PO DAILY 01/10/18 01/10/18 01/10/18 History











Active Meds: 


Active Medications





Dextrose/Lactated Ringer's (D5lr)  1,000 mls @ 500 mls/hr IV AS DIRECT ISABELA


   Stop: 01/11/18 23:59


   Last Admin: 01/11/18 09:02 Dose:  500 mls/hr


Multivitamins/Iron/Calcium (Prenatal Vitamin)  1 each PO QDAY ISABELA


Ondansetron HCl (Zofran)  4 mg IV Q6H PRN


   PRN Reason: N/V unrelieved by Reglan


Promethazine HCl (Phenergan)  25 mg OK Q6H ISABELA


   Last Admin: 01/11/18 10:09 Dose:  25 mg











Review of Systems


All systems: negative (abdominal pain, nausea and vomiting)





Exam


 Vital Signs











Temp Pulse Resp BP Pulse Ox


 


 98.2 F   128 H  18   115/72   98 


 


 01/10/18 14:56  01/10/18 14:56  01/10/18 14:56  01/10/18 14:56  01/10/18 14:56














- General physical appearance


Positive: well developed, well nourished, no distress, no pain





- Eyes


Positive: normal occular movement.  Negative: icteric





- Respiratory


Positive: normal expansion, normal respiratory effort, clear to auscultation





- Cardiovascular


Rhythm: regular





- Extremities


Extremities: No edema





- Abdomen


Abdomen: Present: soft, tender (very minimal in RUQ, flank), bowel sounds 

normal.  Absent: distended, rebound, guarding, rigid





- Neurologic


Neurologic: alert and oriented to time, place and person





- Psychiatric


Psychiatric: appropriate mood/affect, intact judgment & insight, cooperative





Results





- Labs





 01/10/18 15:30





 01/10/18 15:30


 Abnormal lab results











  01/10/18 01/10/18 01/10/18 Range/Units





  15:01 15:30 15:30 


 


MCHC   35 H   (30-34)  %


 


Mono % (Auto)   8.8 H   (0.0-7.3)  %


 


Potassium    2.9 L*  (3.6-5.0)  mmol/L


 


BUN    5 L  (7-17)  mg/dL


 


Creatinine    0.3 L  (0.7-1.2)  mg/dL


 


Calcium    7.9 L  (8.4-10.2)  mg/dL


 


Magnesium     (1.7-2.3)  mg/dL


 


AST    75 H  (5-40)  units/L


 


ALT    85 H  (7-56)  units/L


 


Total Protein    5.6 L  (6.3-8.2)  g/dL


 


Albumin    2.9 L  (3.9-5)  g/dL


 


Lipase  144 H    (13-60)  units/L


 


TSH     (0.270-4.200)  mlU/mL


 


Free T4     (0.76-1.46)  ng/dL


 


HCG, Quant     (0-4)  mIU/mL


 


Urine WBC (Auto)     (0.0-6.0)  /HPF














  01/10/18 01/10/18 01/10/18 Range/Units





  18:06 18:06 18:06 


 


MCHC     (30-34)  %


 


Mono % (Auto)     (0.0-7.3)  %


 


Potassium     (3.6-5.0)  mmol/L


 


BUN     (7-17)  mg/dL


 


Creatinine     (0.7-1.2)  mg/dL


 


Calcium     (8.4-10.2)  mg/dL


 


Magnesium  1.50 L    (1.7-2.3)  mg/dL


 


AST     (5-40)  units/L


 


ALT     (7-56)  units/L


 


Total Protein     (6.3-8.2)  g/dL


 


Albumin     (3.9-5)  g/dL


 


Lipase     (13-60)  units/L


 


TSH    0.005 L  (0.270-4.200)  mlU/mL


 


Free T4    3.04 H  (0.76-1.46)  ng/dL


 


HCG, Quant   64791 H   (0-4)  mIU/mL


 


Urine WBC (Auto)     (0.0-6.0)  /HPF














  01/10/18 Range/Units





  20:59 


 


MCHC   (30-34)  %


 


Mono % (Auto)   (0.0-7.3)  %


 


Potassium   (3.6-5.0)  mmol/L


 


BUN   (7-17)  mg/dL


 


Creatinine   (0.7-1.2)  mg/dL


 


Calcium   (8.4-10.2)  mg/dL


 


Magnesium   (1.7-2.3)  mg/dL


 


AST   (5-40)  units/L


 


ALT   (7-56)  units/L


 


Total Protein   (6.3-8.2)  g/dL


 


Albumin   (3.9-5)  g/dL


 


Lipase   (13-60)  units/L


 


TSH   (0.270-4.200)  mlU/mL


 


Free T4   (0.76-1.46)  ng/dL


 


HCG, Quant   (0-4)  mIU/mL


 


Urine WBC (Auto)  18.0 H  (0.0-6.0)  /HPF








 Diabetes panel











  01/10/18 Range/Units





  15:30 


 


Sodium  137  (137-145)  mmol/L


 


Potassium  2.9 L*  (3.6-5.0)  mmol/L


 


Chloride  100.4  ()  mmol/L


 


Carbon Dioxide  22  (22-30)  mmol/L


 


BUN  5 L  (7-17)  mg/dL


 


Creatinine  0.3 L  (0.7-1.2)  mg/dL


 


Glucose  82  ()  mg/dL


 


Calcium  7.9 L  (8.4-10.2)  mg/dL


 


AST  75 H  (5-40)  units/L


 


ALT  85 H  (7-56)  units/L


 


Alkaline Phosphatase  98  ()  units/L


 


Total Protein  5.6 L  (6.3-8.2)  g/dL


 


Albumin  2.9 L  (3.9-5)  g/dL








 Thyroid panel











  01/10/18 Range/Units





  18:06 


 


TSH  0.005 L  (0.270-4.200)  mlU/mL








 Calcium panel











  01/10/18 Range/Units





  15:30 


 


Calcium  7.9 L  (8.4-10.2)  mg/dL


 


Albumin  2.9 L  (3.9-5)  g/dL








 Pituitary panel











  01/10/18 01/10/18 Range/Units





  15:30 18:06 


 


Sodium  137   (137-145)  mmol/L


 


Potassium  2.9 L*   (3.6-5.0)  mmol/L


 


Chloride  100.4   ()  mmol/L


 


Carbon Dioxide  22   (22-30)  mmol/L


 


BUN  5 L   (7-17)  mg/dL


 


Creatinine  0.3 L   (0.7-1.2)  mg/dL


 


Glucose  82   ()  mg/dL


 


Calcium  7.9 L   (8.4-10.2)  mg/dL


 


TSH   0.005 L  (0.270-4.200)  mlU/mL








 Adrenal panel











  01/10/18 Range/Units





  15:30 


 


Sodium  137  (137-145)  mmol/L


 


Potassium  2.9 L*  (3.6-5.0)  mmol/L


 


Chloride  100.4  ()  mmol/L


 


Carbon Dioxide  22  (22-30)  mmol/L


 


BUN  5 L  (7-17)  mg/dL


 


Creatinine  0.3 L  (0.7-1.2)  mg/dL


 


Glucose  82  ()  mg/dL


 


Calcium  7.9 L  (8.4-10.2)  mg/dL


 


Total Bilirubin  0.50  (0.1-1.2)  mg/dL


 


AST  75 H  (5-40)  units/L


 


ALT  85 H  (7-56)  units/L


 


Alkaline Phosphatase  98  ()  units/L


 


Total Protein  5.6 L  (6.3-8.2)  g/dL


 


Albumin  2.9 L  (3.9-5)  g/dL














Assessment and Plan





- Patient Problems


(1) Cholelithiasis affecting pregnancy in second trimester, antepartum


Current Visit: Yes   Status: Acute   


Plan to address problem: 


I think the main issues are her hyperemesis and the UTI. She is not show a 

classic pattern of biliary colic. The LFT continue to come down. I would not 

recommend repeat imaging as she is better with simply having some pain meds and 

a different nausea med. When ready fo discharge, would suggest sending her home 

with some pain meds as well. 








(2) Hyperemesis gravidarum


Onset Date: 01/06/18   Current Visit: Yes   Status: Acute   


Plan to address problem: 


I think this is bigger issue than the cholelithiasis. The phenergan seems to be 

working better for her. Would recommend that when ready for discharge, consider 

script for phenergan instead of zofran.











(3) Hypokalemia, gastrointestinal losses


Current Visit: Yes   Status: Acute   


Plan to address problem: 


This is continuing issue. May need to consider a higher replacement dose. She 

may not be keeping up with her emesis losses. have to wonder if she has been 

tolerating the PO med. 








(4) UTI (urinary tract infection)


Current Visit: Yes   Status: Acute   


Qualifiers: 


   Encounter type: subsequent encounter 


Plan to address problem: 


Recommend abx therapy. Switch to IV Cipro? E.coli sens to cipro on recent urine 

culture results.

## 2018-01-11 NOTE — HISTORY AND PHYSICAL REPORT
History of Present Illness


Date of examination: 18


Date of admission: 


01/10/18 21:37





Chief complaint: 





Recurrent nausea and vomiting


History of present illness: 





Patient is a 40 year old , LMP 10/11/17 who is at 14+ weeks gestation 

who was re-admitted for vomiting, inability to tolerated PO intake, 12-lbs 

weight loss, RUQ pain.  She was previously admitted 18 for a similar 

episode and discharged to home on 18 after considerable improvement.  She 

denied any fever, pelvic pain, or vaginal bleeding. She had just started PNC at 

[facility]. She went  for her routine visit on 17 with the 

above complaints, and was sent to the ER for evaluation. In the ER, she was 

found to be very dehydrated, vomiting, elevated liver enzymes and low 

potassium. She was given IV fluid, K+ via K-rider, zofran. Abdominal sonogram 

showed: + gallstone with gallbladder sludge. Surgical consult was done and 

recommended no intervention at present as patient is afebrile


OB sono showed a viable fetus at 12 weeks and 6 days.


On exam: + TN in her RUQ and right upper back, no pelvic TN.


Today, her pain has decreased in severity and she feels hungry. Her LFT's have 

decreased.


She has been tachycardic and was found to have hyperthyroidism. 


PTU has been given.


MFM consult done and agreed with current management.


This morning she is nauseated but no vomiting. 


Urine culture positive for 100,000 gram-negative rods found to be Escherichia 

coli sensitive to Macrobid








Past History


Past Medical History: thyroid disease, GERD


Past Surgical History: no surgical history


Family/Genetic History: none


Social history: no significant social history, 





- Obstetrical History


: 2





Medications and Allergies


 Allergies











Allergy/AdvReac Type Severity Reaction Status Date / Time


 


Penicillins AdvReac  Rash Verified 18 13:31











 Home Medications











 Medication  Instructions  Recorded  Confirmed  Last Taken  Type


 


Ondansetron [Zofran Odt] 4 mg PO Q6H PRN #7 tab.rapdis 12/19/17 01/10/18 01/10/

18 Rx


 


Nitrofurantoin Monohyd/M-Cryst 100 mg PO BID #30 capsule 01/09/18 01/10/18 01/10

/18 Rx





[Macrobid 100 mg Capsule]     


 


Potassium Chloride 20 meq PO DAILY 01/10/18 01/10/18 01/10/18 History











Active Meds: 


Active Medications





Dextrose/Lactated Ringer's (D5lr)  1,000 mls @ 500 mls/hr IV AS DIRECT ISABELA


   Stop: 18 23:59


   Last Admin: 18 09:02 Dose:  500 mls/hr


Multivitamins/Iron/Calcium (Prenatal Vitamin)  1 each PO QDAY [facility]


Ondansetron HCl (Zofran)  4 mg IV Q6H PRN


   PRN Reason: N/V unrelieved by Reglan


Promethazine HCl (Phenergan)  25 mg UT Q6H ISABELA


   Last Admin: 18 10:09 Dose:  25 mg











Review of Systems


All systems: negative





- Vital Signs


Vital signs: 


 Vital Signs











Temp Pulse Resp BP Pulse Ox


 


 98.2 F   128 H  18   115/72   98 


 


 01/10/18 14:56  01/10/18 14:56  01/10/18 14:56  01/10/18 14:56  01/10/18 14:56








 











Temp Pulse Resp BP Pulse Ox


 


 98.1 F   86   18   88/42   98 


 


 18 05:00  18 05:00  18 05:00  18 05:00  18 05:00














- Physical Exam


Breasts: Positive: deferred


Cardiovascular: Regular rate


Lungs: Positive: Clear to auscultation


Abdomen: Positive: normal appearance, soft





Results


Result Diagrams: 


 01/10/18 15:30





 18 12:00


 Abnormal lab results











  01/10/18 01/10/18 01/10/18 Range/Units





  15:01 15:30 15:30 


 


MCHC   35 H   (30-34)  %


 


Mono % (Auto)   8.8 H   (0.0-7.3)  %


 


Potassium    2.9 L*  (3.6-5.0)  mmol/L


 


BUN    5 L  (7-17)  mg/dL


 


Creatinine    0.3 L  (0.7-1.2)  mg/dL


 


Calcium    7.9 L  (8.4-10.2)  mg/dL


 


Magnesium     (1.7-2.3)  mg/dL


 


AST    75 H  (5-40)  units/L


 


ALT    85 H  (7-56)  units/L


 


Total Protein    5.6 L  (6.3-8.2)  g/dL


 


Albumin    2.9 L  (3.9-5)  g/dL


 


Lipase  144 H    (13-60)  units/L


 


TSH     (0.270-4.200)  mlU/mL


 


Free T4     (0.76-1.46)  ng/dL


 


HCG, Quant     (0-4)  mIU/mL


 


Urine WBC (Auto)     (0.0-6.0)  /HPF














  01/10/18 01/10/18 01/10/18 Range/Units





  18:06 18:06 18:06 


 


MCHC     (30-34)  %


 


Mono % (Auto)     (0.0-7.3)  %


 


Potassium     (3.6-5.0)  mmol/L


 


BUN     (7-17)  mg/dL


 


Creatinine     (0.7-1.2)  mg/dL


 


Calcium     (8.4-10.2)  mg/dL


 


Magnesium  1.50 L    (1.7-2.3)  mg/dL


 


AST     (5-40)  units/L


 


ALT     (7-56)  units/L


 


Total Protein     (6.3-8.2)  g/dL


 


Albumin     (3.9-5)  g/dL


 


Lipase     (13-60)  units/L


 


TSH    0.005 L  (0.270-4.200)  mlU/mL


 


Free T4    3.04 H  (0.76-1.46)  ng/dL


 


HCG, Quant   94092 H   (0-4)  mIU/mL


 


Urine WBC (Auto)     (0.0-6.0)  /HPF














  01/10/18 Range/Units





  20:59 


 


MCHC   (30-34)  %


 


Mono % (Auto)   (0.0-7.3)  %


 


Potassium   (3.6-5.0)  mmol/L


 


BUN   (7-17)  mg/dL


 


Creatinine   (0.7-1.2)  mg/dL


 


Calcium   (8.4-10.2)  mg/dL


 


Magnesium   (1.7-2.3)  mg/dL


 


AST   (5-40)  units/L


 


ALT   (7-56)  units/L


 


Total Protein   (6.3-8.2)  g/dL


 


Albumin   (3.9-5)  g/dL


 


Lipase   (13-60)  units/L


 


TSH   (0.270-4.200)  mlU/mL


 


Free T4   (0.76-1.46)  ng/dL


 


HCG, Quant   (0-4)  mIU/mL


 


Urine WBC (Auto)  18.0 H  (0.0-6.0)  /HPF








All other labs normal.








Assessment and Plan





- Patient Problems


(1) 14 weeks gestation of pregnancy


Onset Date: 18   Current Visit: Yes   Status: Acute   


Plan to address problem: 


A:  IUP @ 14 0/7 weeks


      Hyperemesis gravidarum


      Hypokalemia


      Cholelethiasis


      Elevated LFT's


      Hyperthyroidism - on PTU


      UTI - on Macrobid





 P:  Admit for IV hydration, IV potassium and further evaluation


      Obtain GI and Surgery consultations








(2) Hyperemesis gravidarum


Onset Date: 18   Current Visit: Yes   Status: Acute   





(3) Cholelithiasis affecting pregnancy in first trimester, antepartum


Onset Date: 18   Current Visit: No   Status: Acute   





(4) Dehydration during pregnancy


Onset Date: 18   Current Visit: No   Status: Acute   





(5) Elevated LFTs


Onset Date: 18   Current Visit: No   Status: Acute   





(6) Hyperthyroidism affecting pregnancy


Onset Date: 18   Current Visit: No   Status: Acute   


Qualifiers: 


   Trimester: first trimester   Qualified Code(s): O99.281 - Endocrine, 

nutritional and metabolic diseases complicating pregnancy, first trimester; 

E05.90 - Thyrotoxicosis, unspecified without thyrotoxic crisis or storm; E05.90 

- Thyrotoxicosis, unspecified without thyrotoxic crisis or storm; E05.90 - 

Thyrotoxicosis, unspecified without thyrotoxic crisis or storm   





(7) Hypokalemia


Onset Date: 18   Current Visit: No   Status: Acute   





(8) UTI (urinary tract infection) during pregnancy


Onset Date: 18   Current Visit: No   Status: Acute   


Qualifiers: 


   Trimester: first trimester   Qualified Code(s): O23.41 - Unspecified 

infection of urinary tract in pregnancy, first trimester

## 2018-01-12 NOTE — PROGRESS NOTE
Assessment and Plan


A:  IUP @ 14 1/7 weeks


      N/V of Preg


      ABD pain


      UTI





 P: 


-Discussed with general surgery, thanks


-Start acetaminophen for abdominal pain, consider ibuprofen if no improvement


-Will start Rocephin 1 g daily for UTI


-Continue Phenergan


-Disposition in 24-48 hours








- Patient Problems


(1) 14 weeks gestation of pregnancy


Onset Date: 01/11/18   Current Visit: Yes   Status: Acute   





(2) Nausea and vomiting during pregnancy prior to 22 weeks gestation


Current Visit: Yes   Status: Acute   





Subjective





- Subjective


Date of service: 01/12/18


Principal diagnosis: IUP @ 14+1 wks, N/V of preg, Abd pain - ?msk


Interval history: 


Patient seen and examined, and spoke with Gen. surgery services.  Nausea 

vomiting appears improved on Phenergan, though complains of abdominal pain





Patient reports: new complaints, no loss of fluid, no vaginal bleeding, no 

contractions





Objective





- Vital Signs


Vital Signs: 


 Vital Signs - 12hr











  01/11/18 01/12/18





  23:40 04:35


 


Temperature 97.8 F 98.8 F


 


Pulse Rate 79 77


 


Respiratory 18 18





Rate  


 


Blood Pressure 87/49 84/46





[Left]  


 


O2 Sat by Pulse 97 97





Oximetry  














- Exam


Abdomen: Present: normal appearance, soft, tenderness (on my exam, has 

epigastric tenderness).  Absent: distention, guarding





- Labs


Labs: 


 Abnormal Labs











  01/10/18 01/10/18 01/10/18





  15:01 15:30 15:30


 


MCHC   35 H 


 


Mono % (Auto)   8.8 H 


 


Potassium    2.9 L*


 


BUN    5 L


 


Creatinine    0.3 L


 


Calcium    7.9 L


 


Magnesium   


 


AST    75 H


 


ALT    85 H


 


Total Protein    5.6 L


 


Albumin    2.9 L


 


Lipase  144 H  


 


TSH   


 


Free T4   


 


HCG, Quant   


 


Urine WBC (Auto)   














  01/10/18 01/10/18 01/10/18





  18:06 18:06 18:06


 


MCHC   


 


Mono % (Auto)   


 


Potassium   


 


BUN   


 


Creatinine   


 


Calcium   


 


Magnesium  1.50 L  


 


AST   


 


ALT   


 


Total Protein   


 


Albumin   


 


Lipase   


 


TSH    0.005 L


 


Free T4    3.04 H


 


HCG, Quant   60628 H 


 


Urine WBC (Auto)   














  01/10/18 01/11/18 01/12/18





  20:59 12:00 05:55


 


MCHC   


 


Mono % (Auto)   


 


Potassium   2.9 L*  3.4 L


 


BUN   


 


Creatinine   


 


Calcium   


 


Magnesium   


 


AST   


 


ALT   


 


Total Protein   


 


Albumin   


 


Lipase   


 


TSH   


 


Free T4   


 


HCG, Quant   


 


Urine WBC (Auto)  18.0 H  








 Laboratory Results - last 24 hr











  01/11/18 01/11/18 01/12/18





  12:00 17:05 05:55


 


Potassium  2.9 L*   3.4 L


 


Urine Ketones   Neg

## 2018-01-12 NOTE — PROGRESS NOTE
Assessment and Plan





- Patient Problems


(1) Cholelithiasis affecting pregnancy in second trimester, antepartum


Current Visit: Yes   Status: Acute   


Plan to address problem: 


I had a long conversation with the patient via the phone . She 

reports that her right side pain is constant and is not affected by eating. I 

explained my dilemma that she does not have clear evidence of a gallbladder 

problem that would necessitate surgery, but she has pain. I would hate to 

remove the GB, subject her to all the risks, and she still has the pain after 

we are done. Her lab work can all be explained by the Hyperemesis. The pain 

appears to be musculoskeletal in nature as patients are not normally tender to 

palpation in the right shoulder and right back. these are areas of referred 

pain usually. She was in agreement to try pain medicine in hopes of delaying/

avoiding surgery in order to protect her baby. 





I spoke with Dr. Grier. I explained my perspective. She is not acting like a 

typical cholecystitis or symptomatic cholelithiasis. I think the pain is 

musculoskeletal in nature. he said that she was far enough along in the 

pregnancy that we could use ibuprofen. Also we could use lidocaine jelly 

topically. We will try these meds and reassess her in the AM. 





Time=45min








(2) Hyperemesis gravidarum


Onset Date: 01/06/18   Current Visit: Yes   Status: Acute   


Plan to address problem: 


I think this is bigger issue than the cholelithiasis. The phenergan seems to be 

working better for her. Would recommend that when ready for discharge, consider 

script for phenergan instead of zofran.











(3) Hypokalemia, gastrointestinal losses


Current Visit: Yes   Status: Acute   


Plan to address problem: 


Mgmt per OB team








(4) UTI (urinary tract infection)


Current Visit: Yes   Status: Acute   


Qualifiers: 


   Encounter type: subsequent encounter 


Plan to address problem: 


Tx per OB team








(5) Muscle pain


Current Visit: Yes   Status: Acute   


Plan to address problem: 


Will try scheduled ibuprofen and topical lidocaine jelly. Reassess in AM. 








Subjective


Patient Reports: Positive: no new complaints (Feels hungry today. )





Objective


 Vital Signs - 12hr











  01/11/18 01/12/18





  23:40 04:35


 


Temperature 97.8 F 98.8 F


 


Pulse Rate 79 77


 


Respiratory 18 18





Rate  


 


Blood Pressure 87/49 84/46





[Left]  


 


O2 Sat by Pulse 97 97





Oximetry  














- General physical appearance


well developed, well nourished, no distress, no pain





- Eyes


normal occular movement





- Respiratory


normal expansion, normal respiratory effort





- Abdomen


soft, tender, bowel sounds normal, not distended, not guarding, not rigid





- Musculoskeletal


other (tednerness in right shoulder and right mid-back)





- Psychiatric


oriented to time, oriented to person, oriented to place, speech is normal, 

memory intact





- Labs





 01/10/18 15:30





 01/12/18 05:55


 Diabetes panel











  01/11/18 01/12/18 Range/Units





  12:00 05:55 


 


Potassium  2.9 L*  3.4 L  (3.6-5.0)  mmol/L








 Pituitary panel











  01/11/18 01/12/18 Range/Units





  12:00 05:55 


 


Potassium  2.9 L*  3.4 L  (3.6-5.0)  mmol/L








 Adrenal panel











  01/11/18 01/12/18 Range/Units





  12:00 05:55 


 


Potassium  2.9 L*  3.4 L  (3.6-5.0)  mmol/L

## 2018-01-13 NOTE — PROGRESS NOTE
Assessment and Plan


A:  IUP @ 14 2/7 weeks


      N/V of Preg


      ABD pain


      UTI





 P: 


-Discussed with general surgery, thanks


-Continue ibuprofen 


-Continue Rocephin 1 g daily for UTI


-Continue Phenergan


-Possible discharge in 24 hours





- Patient Problems


(1) 14 weeks gestation of pregnancy


Onset Date: 01/11/18   Current Visit: Yes   Status: Acute   





(2) Nausea and vomiting during pregnancy prior to 22 weeks gestation


Current Visit: Yes   Status: Acute   





Subjective





- Subjective


Date of service: 01/13/18


Principal diagnosis: IUP @ 14+2 wks, N/V of preg, Abd pain - ?msk


Interval history: 


Patient seen and examined, stable and doing well.    Nausea vomiting appears 

improved on Phenergan, and no abdominal pain during my discussion with her.  

Explained to her that I have discontinued all narcotics and she will continue 

ibuprofen.  Considering possible discharge tomorrow





Patient reports: new complaints, no loss of fluid, no vaginal bleeding, no 

contractions





Objective





- Vital Signs


Vital Signs: 


 Vital Signs - 12hr











  01/13/18 01/13/18





  00:40 04:25


 


Temperature 98.0 F 98.0 F


 


Pulse Rate 89 94 H


 


Respiratory 18 18





Rate  


 


Blood Pressure 93/42 86/40





[Left]  


 


O2 Sat by Pulse 97 97





Oximetry  














- Exam


Abdomen: Present: normal appearance, soft.  Absent: distention, tenderness, 

guarding, rigidity





- Labs


Labs: 


 Abnormal Labs











  01/10/18 01/10/18 01/10/18





  15:01 15:30 15:30


 


MCHC   35 H 


 


Mono % (Auto)   8.8 H 


 


Potassium    2.9 L*


 


BUN    5 L


 


Creatinine    0.3 L


 


Calcium    7.9 L


 


Magnesium   


 


AST    75 H


 


ALT    85 H


 


Total Protein    5.6 L


 


Albumin    2.9 L


 


Lipase  144 H  


 


TSH   


 


Free T4   


 


HCG, Quant   


 


Urine WBC (Auto)   














  01/10/18 01/10/18 01/10/18





  18:06 18:06 18:06


 


MCHC   


 


Mono % (Auto)   


 


Potassium   


 


BUN   


 


Creatinine   


 


Calcium   


 


Magnesium  1.50 L  


 


AST   


 


ALT   


 


Total Protein   


 


Albumin   


 


Lipase   


 


TSH    0.005 L


 


Free T4    3.04 H


 


HCG, Quant   18559 H 


 


Urine WBC (Auto)   














  01/10/18 01/11/18 01/12/18





  20:59 12:00 05:55


 


MCHC   


 


Mono % (Auto)   


 


Potassium   2.9 L*  3.4 L


 


BUN   


 


Creatinine   


 


Calcium   


 


Magnesium   


 


AST   


 


ALT   


 


Total Protein   


 


Albumin   


 


Lipase   


 


TSH   


 


Free T4   


 


HCG, Quant   


 


Urine WBC (Auto)  18.0 H

## 2018-01-13 NOTE — PROGRESS NOTE
Assessment and Plan





- Patient Problems


(1) Cholelithiasis affecting pregnancy in second trimester, antepartum


Current Visit: Yes   Status: Acute   


Plan to address problem: 


I had a long conversation with the patient via the phone  on 1/12. 

She reports that her right side pain is constant and is not affected by eating. 

I explained my dilemma that she does not have clear evidence of a gallbladder 

problem that would necessitate surgery, but she has pain. I would hate to 

remove the GB, subject her to all the risks, and she still has the pain after 

we are done. Her lab work can all be explained by the Hyperemesis. The pain 

appears to be musculoskeletal in nature as patients are not normally tender to 

palpation in the right shoulder and right back. these are areas of referred 

pain usually. She was in agreement to try pain medicine in hopes of delaying/

avoiding surgery in order to protect her baby. 





I spoke with Dr. Grier on 1/12. I explained my perspective. She is not acting 

like a typical cholecystitis or symptomatic cholelithiasis. I think the pain is 

musculoskeletal in nature. he said that she was far enough along in the 

pregnancy that we could use ibuprofen. 





1/13 - Unfortunately, the medication plan we had yesterday was not able to be 

started due to limited resources.  Therefore I spoke with pharmacy this morning 

to make sure which medicines we have available to order.  I ordered lidocaine 5

% ointment for musculoskeletal pain.  I have instructed the nurse and the 

patient that we will apply this once today in a small amount to see how this 

affects her pain.  I instructed the nurse on what a small amount ("quarter size 

amount") is by showing her on my hand.  She should not need to use the entire 

tube.  She acknowledged understanding.  In addition, I will order scheduled 

ibuprofen for her as she did not get this yesterday.  As mentioned above, this 

was cleared by Dr. Grier. 





I think we have continued evidence that she is not having biliary colic.  She 

was able to tolerate a regular diet last night and this morning without any 

change in her musculoskeletal pain.  Through the , I recommended 

that she had a bland diet with minimal to no fat and no fried foods.





It appears as though her nausea and vomiting are much better controlled with 

the new regimen.  She had no problems last night or this morning.  If her pain 

is better with the ibuprofen and lidocaine jelly, I think she could be 

discharged home with this pain regimen.  If she should go home over the weekend

, I recommend scheduled ibuprofen for one week and a prescription for 2% 

lidocaine ointment to be used once or twice a day to the right shoulder, right 

mid back, and right flank if she is having pain.





If there are new issues or problems, please call me.  Otherwise I will see her 

again on Monday if she is still here.





Time=20min








(2) Hyperemesis gravidarum


Onset Date: 01/06/18   Current Visit: Yes   Status: Acute   


Plan to address problem: 


Appears clinically better








(3) Hypokalemia, gastrointestinal losses


Current Visit: Yes   Status: Acute   


Plan to address problem: 


No new labs today








(4) UTI (urinary tract infection)


Current Visit: Yes   Status: Acute   


Qualifiers: 


   Encounter type: subsequent encounter 


Plan to address problem: 


Culture shows normal skin haris








(5) Muscle pain


Current Visit: Yes   Status: Acute   


Plan to address problem: 


Please see detailed description under cholelithiasis section.  I have reordered 

the lidocaine and ibuprofen for today.  Her pain still seems consistent with 

musculoskeletal pain.








Subjective


Date of service: 01/13/18


Patient Reports: Positive: still having pain (in the right shoulder, back, and 

entire right side. Hurts when she sneezes. had no change in pain with regular 

diet last night and this morning. ), afebrile.  Negative: nausea, vomiting (

Tolerated diet last night and this morning without any probolems)





Objective


 Vital Signs - 12hr











  01/13/18 01/13/18





  00:40 04:25


 


Temperature 98.0 F 98.0 F


 


Pulse Rate 89 94 H


 


Respiratory 18 18





Rate  


 


Blood Pressure 93/42 86/40





[Left]  


 


O2 Sat by Pulse 97 97





Oximetry  














- General physical appearance


well developed, well nourished, no distress, no pain, other (easily to another 

room.  Showed no signs of limitations from pain.)





- Eyes


normal occular movement





- Respiratory


normal expansion, normal respiratory effort





- Abdomen


soft, tender (in the right mid back and along the right flank all the way down 

to the iliac crest.  Rest of the abdomen was benign), bowel sounds normal, not 

distended, not guarding, not rigid





- Musculoskeletal


normal gait, normal posture





- Psychiatric


oriented to time, oriented to person, oriented to place, speech is normal, 

memory intact





- Labs





 01/10/18 15:30





 01/12/18 05:55

## 2018-01-14 NOTE — PROGRESS NOTE
Assessment and Plan


A:  IUP @ 14 3/7 weeks


      N/V of Preg


      ABD pain


      UTI





 P: 


-Discharged home


-Follow up in clinic in 1-2 weeks





- Patient Problems


(1) 14 weeks gestation of pregnancy


Onset Date: 01/11/18   Current Visit: Yes   Status: Acute   





(2) Nausea and vomiting during pregnancy prior to 22 weeks gestation


Current Visit: Yes   Status: Acute   





Subjective





- Subjective


Date of service: 01/14/18


Principal diagnosis: IUP @ 14+3 wks, N/V of preg, Abd pain - ?msk


Interval history: 


Patient seen and examined, stable and doing well.    Nausea vomiting improved 

on Phenergan, and no abdominal pain this time.  Explained to her that I have 

discontinued all narcotics and she will continue ibuprofen.  





Patient reports: new complaints, no loss of fluid, no vaginal bleeding, no 

contractions





Objective





- Vital Signs


Vital Signs: 


 Vital Signs - 12hr











  01/14/18 01/14/18





  00:30 04:30


 


Temperature 98.3 F 97.5 F L


 


Pulse Rate 88 79


 


Respiratory 18 18





Rate  


 


Blood Pressure 82/42 89/48





[Left]  


 


O2 Sat by Pulse 98 99





Oximetry  














- Exam


Abdomen: Present: normal appearance, soft.  Absent: distention, tenderness, 

guarding, rigidity





- Labs


Labs: 


 Abnormal Labs











  01/10/18 01/10/18 01/10/18





  15:01 15:30 15:30


 


MCHC   35 H 


 


Mono % (Auto)   8.8 H 


 


Potassium    2.9 L*


 


BUN    5 L


 


Creatinine    0.3 L


 


Calcium    7.9 L


 


Magnesium   


 


AST    75 H


 


ALT    85 H


 


Total Protein    5.6 L


 


Albumin    2.9 L


 


Lipase  144 H  


 


TSH   


 


Free T4   


 


HCG, Quant   


 


Urine WBC (Auto)   














  01/10/18 01/10/18 01/10/18





  18:06 18:06 18:06


 


MCHC   


 


Mono % (Auto)   


 


Potassium   


 


BUN   


 


Creatinine   


 


Calcium   


 


Magnesium  1.50 L  


 


AST   


 


ALT   


 


Total Protein   


 


Albumin   


 


Lipase   


 


TSH    0.005 L


 


Free T4    3.04 H


 


HCG, Quant   23998 H 


 


Urine WBC (Auto)   














  01/10/18 01/11/18 01/12/18





  20:59 12:00 05:55


 


MCHC   


 


Mono % (Auto)   


 


Potassium   2.9 L*  3.4 L


 


BUN   


 


Creatinine   


 


Calcium   


 


Magnesium   


 


AST   


 


ALT   


 


Total Protein   


 


Albumin   


 


Lipase   


 


TSH   


 


Free T4   


 


HCG, Quant   


 


Urine WBC (Auto)  18.0 H

## 2018-01-14 NOTE — DISCHARGE SUMMARY
Providers





- Providers


Date of Admission: 


01/10/18 21:37





Date of discharge: 18


Attending physician: 


BRANDEN CERON MD





 





01/10/18 21:03


Consult to Physician [CONS] Routine 


   Consulting Provider: BRIANDA FLOR


   Reason For Exam: cholelithiasis


   Place consult to:: Dr. Flor


   Notified:: Answering Service


   Phone number called:: [phone]


   Was contact made?: Yes


   If yes, spoke with:: Dr. Flor


   Time called:: 20:39


   Comment:: Dr. Gee (er dr) spoke with Dr. Flor





01/10/18 21:37


Consult to Physician [CONS] Routine 


   Consulting Provider: BORIS VALLJEO


   Reason For Exam: gallbladder dysfunction


   Place consult to:: Dr. Vallejo


   Notified:: his number


   Phone number called:: his number


   Was contact made?: Yes


   If yes, spoke with:: Dr Vallejo





18 08:17


Consult to Case Management [CONS] Routine 


   Services Needed at Discharge: 


   Notified:: 


   Additional Physician Instructions: Please assess for [facility] care vs 

Medicaid. Thank you.











Primary care physician: 


BRANDEN CERON MD








Hospitalization


Reason for admission: observation, other (IUP at ~ 14 wks with ABD pain and N/V 

of preg)


Hospital course: 


Patient is a 40 year old , LMP 10/11/17 who is at 14+ weeks gestation 

who was re-admitted for vomiting, inability to tolerated PO intake, 12-lbs 

weight loss, RUQ pain.  She was previously admitted 18 for a similar 

episode and discharged to home on 18 after considerable improvement.  She 

denied any fever, pelvic pain, or vaginal bleeding. She had just started PNC at 

[facility]. She went  for her routine visit on 17 with the 

above complaints, and was sent to the ER for evaluation. In the ER, she was 

found to be very dehydrated, vomiting, elevated liver enzymes and low 

potassium. She was given IV fluid, K+ via K-rider, zofran. Abdominal sonogram 

showed: + gallstone with gallbladder sludge. Surgical consult was done and 

recommended no intervention at present as patient is afebrile


OB sono showed a viable fetus at 12 weeks and 6 days.


On exam: + TN in her RUQ and right upper back, no pelvic TN.


Urine culture positive for 100,000 gram-negative rods found to be Escherichia 

coli sensitive to Macrobid





She was seen by Gen Surgery on consult basis:


"I had a long conversation with the patient via the phone  on . 

She reports that her right side pain is constant and is not affected by eating. 

I explained my dilemma that she does not have clear evidence of a gallbladder 

problem that would necessitate surgery, but she has pain. I would hate to 

remove the GB, subject her to all the risks, and she still has the pain after 

we are done. Her lab work can all be explained by the Hyperemesis. The pain 

appears to be musculoskeletal in nature as patients are not normally tender to 

palpation in the right shoulder and right back. these are areas of referred 

pain usually. She was in agreement to try pain medicine in hopes of delaying/

avoiding surgery in order to protect her baby. 





I spoke with Dr. Grier on . I explained my perspective. She is not acting 

like a typical cholecystitis or symptomatic cholelithiasis. I think the pain is 

musculoskeletal in nature. he said that she was far enough along in the 

pregnancy that we could use ibuprofen. 





I ordered lidocaine 5% ointment for musculoskeletal pain.  I have instructed 

the nurse and the patient that we will apply this once today in a small amount 

to see how this affects her pain.  I instructed the nurse on what a small 

amount ("quarter size amount") is by showing her on my hand.  She should not 

need to use the entire tube.  She acknowledged understanding.  In addition, I 

will order scheduled ibuprofen for her as she did not get this yesterday.  As 

mentioned above, this was cleared by Dr. Grier. 





I think we have continued evidence that she is not having biliary colic.  She 

was able to tolerate a regular diet last night and this morning without any 

change in her musculoskeletal pain.  Through the , I recommended 

that she had a bland diet with minimal to no fat and no fried foods.





It appears as though her nausea and vomiting are much better controlled with 

the new regimen.  She had no problems last night or this morning.  If her pain 

is better with the ibuprofen and lidocaine jelly, I think she could be 

discharged home with this pain regimen.  If she should go home over the weekend

, I recommend scheduled ibuprofen for one week and a prescription for 2% 

lidocaine ointment to be used once or twice a day to the right shoulder, right 

mid back, and right flank if she is having pain.





If there are new issues or problems, please call me.  Otherwise I will see her 

again on Monday if she is still here".





She'll be discharged as per general surgery instructions above.





Condition at discharge: Stable


Disposition: DC-01 TO HOME OR SELFCARE





- Discharge Diagnoses


(1) 14 weeks gestation of pregnancy


Status: Acute   





(2) Nausea and vomiting during pregnancy prior to 22 weeks gestation


Status: Acute   





Plan





- Provider Discharge Summary


Activity: no sex for 6 weeks, no heavy lifting 4 weeks, no strenuous exercise


Diet: other (as per nutrition)


Instructions: routine


Additional instructions: 


[]  Smoking cessation referral if applicable(refer to patient education folder 

for contact #)


[]  Refer to [facility] Booklet








Call your doctor immediately for:


* Fever > 100.5


* Heavy vaginal bleeding ( >1 pad per hour)


* Severe persistent headache


* Shortness of breath


* Reddened, hot, painful area to leg or breast


* Drainage or odor from incision.





* Keep incision clean and dry at all times and follow doctor's instructions 

regarding bathing/showering





 scheduled ibuprofen for one week and a prescription for 2% lidocaine ointment 

to be used once or twice a day to the right shoulder, right mid back, and right 

flank if she is having pain.





- Follow up plan


Follow up: 


CYNDIE CHENEY MD [Staff Physician] - 3-5 Days


BRANDEN CERON MD [Primary Care Provider] - 7 Days